# Patient Record
Sex: FEMALE | Race: WHITE | NOT HISPANIC OR LATINO | Employment: PART TIME | URBAN - METROPOLITAN AREA
[De-identification: names, ages, dates, MRNs, and addresses within clinical notes are randomized per-mention and may not be internally consistent; named-entity substitution may affect disease eponyms.]

---

## 2024-06-17 ENCOUNTER — LAB REQUISITION (OUTPATIENT)
Dept: LAB | Facility: HOSPITAL | Age: 29
End: 2024-06-17
Payer: COMMERCIAL

## 2024-06-17 DIAGNOSIS — L02.211 CUTANEOUS ABSCESS OF ABDOMINAL WALL: ICD-10-CM

## 2024-06-17 PROCEDURE — 87186 SC STD MICRODIL/AGAR DIL: CPT

## 2024-06-17 PROCEDURE — 87070 CULTURE OTHR SPECIMN AEROBIC: CPT

## 2024-06-17 PROCEDURE — 87075 CULTR BACTERIA EXCEPT BLOOD: CPT

## 2024-06-17 PROCEDURE — 87205 SMEAR GRAM STAIN: CPT

## 2024-06-18 ENCOUNTER — HOSPITAL ENCOUNTER (EMERGENCY)
Facility: HOSPITAL | Age: 29
Discharge: HOME | End: 2024-06-18
Payer: MEDICAID

## 2024-06-18 VITALS
SYSTOLIC BLOOD PRESSURE: 98 MMHG | TEMPERATURE: 97.5 F | HEART RATE: 89 BPM | WEIGHT: 124 LBS | OXYGEN SATURATION: 100 % | BODY MASS INDEX: 23.41 KG/M2 | DIASTOLIC BLOOD PRESSURE: 58 MMHG | HEIGHT: 61 IN | RESPIRATION RATE: 16 BRPM

## 2024-06-18 DIAGNOSIS — T78.40XA ALLERGIC REACTION TO DRUG, INITIAL ENCOUNTER: Primary | ICD-10-CM

## 2024-06-18 PROCEDURE — 87205 SMEAR GRAM STAIN: CPT | Mod: GEALAB | Performed by: HEALTH CARE PROVIDER

## 2024-06-18 PROCEDURE — 96374 THER/PROPH/DIAG INJ IV PUSH: CPT

## 2024-06-18 PROCEDURE — 96375 TX/PRO/DX INJ NEW DRUG ADDON: CPT

## 2024-06-18 PROCEDURE — 2500000004 HC RX 250 GENERAL PHARMACY W/ HCPCS (ALT 636 FOR OP/ED): Performed by: HEALTH CARE PROVIDER

## 2024-06-18 PROCEDURE — 99284 EMERGENCY DEPT VISIT MOD MDM: CPT | Mod: 25

## 2024-06-18 RX ORDER — FAMOTIDINE 10 MG/ML
40 INJECTION INTRAVENOUS ONCE
Status: COMPLETED | OUTPATIENT
Start: 2024-06-18 | End: 2024-06-18

## 2024-06-18 RX ORDER — PREDNISONE 20 MG/1
40 TABLET ORAL DAILY
Qty: 10 TABLET | Refills: 0 | Status: SHIPPED | OUTPATIENT
Start: 2024-06-18 | End: 2024-06-23

## 2024-06-18 RX ORDER — DOXYCYCLINE 100 MG/1
100 CAPSULE ORAL 2 TIMES DAILY
Qty: 20 CAPSULE | Refills: 0 | Status: SHIPPED | OUTPATIENT
Start: 2024-06-18 | End: 2024-06-28

## 2024-06-18 RX ORDER — FAMOTIDINE 20 MG/1
20 TABLET, FILM COATED ORAL DAILY
Qty: 15 TABLET | Refills: 0 | Status: SHIPPED | OUTPATIENT
Start: 2024-06-18 | End: 2024-07-03

## 2024-06-18 ASSESSMENT — PAIN SCALES - GENERAL
PAINLEVEL_OUTOF10: 0 - NO PAIN

## 2024-06-18 ASSESSMENT — PAIN - FUNCTIONAL ASSESSMENT: PAIN_FUNCTIONAL_ASSESSMENT: 0-10

## 2024-06-18 ASSESSMENT — COLUMBIA-SUICIDE SEVERITY RATING SCALE - C-SSRS
1. IN THE PAST MONTH, HAVE YOU WISHED YOU WERE DEAD OR WISHED YOU COULD GO TO SLEEP AND NOT WAKE UP?: NO
6. HAVE YOU EVER DONE ANYTHING, STARTED TO DO ANYTHING, OR PREPARED TO DO ANYTHING TO END YOUR LIFE?: NO
2. HAVE YOU ACTUALLY HAD ANY THOUGHTS OF KILLING YOURSELF?: NO

## 2024-06-18 NOTE — ED PROVIDER NOTES
HPI   Chief Complaint   Patient presents with    Allergic Reaction     Pt states she took clindamycin and ibuprofen this morning for a spider bite and developed facial swelling and hives on arms and hands.        CC: allergic reaction   HPI:   28-year-old female presents to ED via EMS for allergic reaction patient was prescribed clindamycin recently for insect bite abscess on the right lower abd region. she took a dose today and shortly after started having hives, itching, she denies any chest pain or shortness of breath, she has not had any previous reactions to antibiotics in the past.  Patient was given 50 mg of Benadryl IV by EMS.    Additional Limitations to History:   External Records Reviewed: I reviewed recent and relevant outside records including   History Obtained From:     Past Medical History: Per HPI  Medications: Reviewed in EMR and with patient  Allergies:  Reviewed in EMR  Past Surgical History:   Social History:     ------------------------------------------------------------------------------------------------------  Physical Exam:  --Vital signs reviewed in nursing triage note, EMR flow sheets, and at patient's bedside  GEN:  A&Ox3, no acute distress, appears comfortable.  Conversational and appropriate.  No confusion or gross mental status changes.  EYES: EOMI, non-injected sclera.  ENT: Moist mucous membranes, no apparent injuries or lesions.   CARDIO: Normal rate and regular rhythm. No murmurs, rubs, or gallops.  2+ equal pulses of the distal extremities.   PULM: Clear to auscultation bilaterally. No rales, rhonchi, or wheezes. Good symmetric chest expansion.  GI: Soft, non-tender, non-distended. No rebound tenderness or guarding.  SKIN: hives, to the upper extremities bilaterally, anterior trunk.  Patient also has draining soft tissue abscess in the right lower abdominal region, tender, centralized ulcerated wound, purulent drainage, erythematous surrounding soft tissue appears to have  improved from previous skin marker.  MSK: ROM intact the extremities without contractures.   EXT: No peripheral edema, contusions, or wounds.   NEURO: Cranial nerves II-XII grossly intact. Sensation to light touch intact and equal bilaterally in upper and lower extremities.  Symmetric 5/5 strength in upper and lower extremities.  PSYCH: Appropriate mood and behavior, converses and responds appropriately during exam.  -------------------------------------------------------------------------------------------------------        Differential Diagnoses Considered:   Chronic Medical Conditions Significantly Affecting Care:   Diagnostic testing considered: [PERC, D-Dimer, PECARN, etc.]      - I independently interpreted: [CXR, CT, POCUS, etc. including your interpretation]  - Labs notable for     Escalation of Care: Appropriate for   Social Determinants of Health Significantly Affecting Care: [Homelessness, lacking transportation, uninsured, unable to afford medications]  Prescription Drug Consideration: [Antibiotics, antivirals, pain medications, etc.]  Discussion of Management with Other Providers:  I discussed the patient/results with: [admitting team, consultant, radiologist, social work, EPAT, case management, PT/OT, RT, PCP, etc.]      Luther Woody PA-C                          [place] Coma Scale Score: 15                     Patient History   History reviewed. No pertinent past medical history.  History reviewed. No pertinent surgical history.  No family history on file.  Social History     Tobacco Use    Smoking status: Never    Smokeless tobacco: Never   Substance Use Topics    Alcohol use: Never    Drug use: Never       Physical Exam   ED Triage Vitals [06/18/24 1154]   Temperature Heart Rate Respirations BP   36.4 °C (97.5 °F) 85 16 125/82      Pulse Ox Temp Source Heart Rate Source Patient Position   98 % Temporal Monitor Sitting      BP Location FiO2 (%)     Right arm --       Physical Exam    ED Course &  [facility]   Diagnoses as of 06/18/24 1847   Allergic reaction to drug, initial encounter       Medical Decision Making  28-year-old female who developed hives, urticaria secondary to clindamycin, patient was given Benadryl by EMS, she was also given Pepcid and Solu-Medrol in the ED, she was observed for several hours, her dressing was changed in the right lower quadrant and eyes improved, patient reports feeling moderately better, low suspicion for anaphylaxis,  pending wound culture, placed on doxycycline and given prescription for prednisone and Pepcid close follow-up advised with her PCP regarding her abscess patient was given return ED precautions.        Procedure  Procedures     Luther Woody PA-C  06/18/24 1249       Luther Woody PA-C  06/18/24 8888

## 2024-06-19 ENCOUNTER — HOSPITAL ENCOUNTER (EMERGENCY)
Facility: HOSPITAL | Age: 29
Discharge: HOME | End: 2024-06-19
Payer: MEDICAID

## 2024-06-19 VITALS
HEIGHT: 61 IN | WEIGHT: 125 LBS | RESPIRATION RATE: 18 BRPM | OXYGEN SATURATION: 100 % | SYSTOLIC BLOOD PRESSURE: 130 MMHG | TEMPERATURE: 99.3 F | BODY MASS INDEX: 23.6 KG/M2 | DIASTOLIC BLOOD PRESSURE: 77 MMHG | HEART RATE: 88 BPM

## 2024-06-19 DIAGNOSIS — Z51.89 VISIT FOR WOUND CHECK: Primary | ICD-10-CM

## 2024-06-19 PROCEDURE — 99282 EMERGENCY DEPT VISIT SF MDM: CPT

## 2024-06-19 PROCEDURE — 2500000001 HC RX 250 WO HCPCS SELF ADMINISTERED DRUGS (ALT 637 FOR MEDICARE OP): Performed by: PHYSICIAN ASSISTANT

## 2024-06-19 RX ORDER — MUPIROCIN 20 MG/G
OINTMENT TOPICAL ONCE
Status: COMPLETED | OUTPATIENT
Start: 2024-06-19 | End: 2024-06-19

## 2024-06-19 ASSESSMENT — LIFESTYLE VARIABLES
TOTAL SCORE: 0
EVER HAD A DRINK FIRST THING IN THE MORNING TO STEADY YOUR NERVES TO GET RID OF A HANGOVER: NO
HAVE PEOPLE ANNOYED YOU BY CRITICIZING YOUR DRINKING: NO
EVER FELT BAD OR GUILTY ABOUT YOUR DRINKING: NO
HAVE YOU EVER FELT YOU SHOULD CUT DOWN ON YOUR DRINKING: NO

## 2024-06-19 ASSESSMENT — PAIN - FUNCTIONAL ASSESSMENT: PAIN_FUNCTIONAL_ASSESSMENT: 0-10

## 2024-06-19 ASSESSMENT — PAIN SCALES - GENERAL: PAINLEVEL_OUTOF10: 0 - NO PAIN

## 2024-06-19 NOTE — LETTER
Belinda Betancourt was seen and treated in our emergency department on 6/19/2024.  She may return to work on 06/21/2024.       If you have any questions or concerns, please don't hesitate to call.      Shahriar Sinha PA-C

## 2024-06-19 NOTE — ED PROVIDER NOTES
HPI   Chief Complaint   Patient presents with    Wound Check     Here yesterday for wound concerned that the wound is either MRSA or Staph. On Doxy. States wounds are spreading to her legs        Is a 28-year-old female coming in for evaluation of her wound to her abdomen.  She was here yesterday and was diagnosed as MRSA.  She had a wound culture completed that did come back positive for MRSA.  She was placed on doxycycline.  She is coming in today because she is having drainage from the area.  She did have the area outlined and the erythema has been improving.  Patient denies any fevers or chills.  She does not have a PCP and has not seen a doctor in multiple years.      History provided by:  Patient and relative                      [place] Coma Scale Score: 15                     Patient History   History reviewed. No pertinent past medical history.  History reviewed. No pertinent surgical history.  No family history on file.  Social History     Tobacco Use    Smoking status: Never    Smokeless tobacco: Never   Substance Use Topics    Alcohol use: Never    Drug use: Never       Physical Exam   ED Triage Vitals [06/19/24 1223]   Temperature Heart Rate Respirations BP   37.4 °C (99.3 °F) 88 18 130/77      Pulse Ox Temp Source Heart Rate Source Patient Position   100 % Skin Monitor Lying      BP Location FiO2 (%)     Right arm --       Physical Exam  Constitutional:       General: She is awake. She is not in acute distress.     Appearance: Normal appearance. She is not ill-appearing, toxic-appearing or diaphoretic.   HENT:      Head: Atraumatic.      Nose: No rhinorrhea.   Eyes:      General: No scleral icterus.        Right eye: No discharge.         Left eye: No discharge.   Cardiovascular:      Rate and Rhythm: Normal rate.   Pulmonary:      Effort: Pulmonary effort is normal. No respiratory distress.   Musculoskeletal:      Cervical back: Normal range of motion and neck supple.   Skin:     Findings: Wound  present.      Comments: Patient has an approximate quarter sized area of ulceration with drainage to the right anterior lower abdomen.  There is surrounding erythema as well as an outlined area that the erythema does contain an side.  Patient does have active drainage of purulent material.  There is warmth to the area.   Neurological:      Mental Status: She is alert.      GCS: GCS eye subscore is 4. GCS verbal subscore is 5. GCS motor subscore is 6.      Gait: Gait is intact.         ED Course & MDM   Diagnoses as of 06/19/24 1330   Visit for wound check       Medical Decision Making  Summary:  Medical Decision Making:   Patient presented as described in HPI. Patient case including ROS, PE, and treatment and plan discussed with ED attending if attached as cosigner. Results from labs and or imaging included below if completed. Belinda Betancourt  is a 28 y.o. coming in for Patient presents with:  Wound Check: Here yesterday for wound concerned that the wound is either MRSA or Staph. On Doxy. States wounds are spreading to her legs   .  Patient does not appear toxic.  She is on Doxy.  She does have an open area of drainage and advised that we can add Bactroban.  It does appear improving.  Doxycycline will be continued and she will be given the Bactroban ointment to apply 3 times a day for 5 days.  She also given a referral for the wound care clinic as well as the need for establishment of a PCP.      Disposition is completed with shared decision making with the patient or guardian present with the patient. They were advised to follow up with PCP or recommended provider in 2-3 days for another evaluation and exam. I advised the patient to return or go to closest emergency room immediately if symptoms change, get worse, or new symptoms develop prior to follow up. I explained the plan and treatment course. Patient/guardian is in agreement with plan, treatment course, and follow up and state that they will comply.    Labs  Reviewed - No data to display   No orders to display                         Tests/Medications/Escalations of Care considered but not given: As in MDM    Patient care discussed with: N/A  Social Determinants affecting care: N/A    Final diagnosis and disposition as documented     Diagnoses as of 06/19/24 1336  Visit for wound check       Shared decision making was completed and determined that patient will be discharged. I discussed the differential; results and discharge plan with the patient and/or family/friend/caregiver if present.  I emphasized the importance of follow-up with the physician I referred them to in the timeframe recommended.  I explained reasons for the patient to return to the Emergency Department. They agreed that if they feel their condition is worsening or if they have any other concern they should call 911 immediately for further assistance. I gave the patient an opportunity to ask all questions they had and answered all of them accordingly. They understand return precautions and discharge instructions. The patient and/or family/friend/caregiver expressed understanding verbally and that they would comply.     Disposition: Discharge      This note has been transcribed using voice recognition and may contain grammatical errors, misplaced words, incorrect words, incorrect phrases or other errors.         Procedure  Procedures     Shahriar Sinha PA-C  06/19/24 2941

## 2024-06-19 NOTE — ED TRIAGE NOTES
Patient here for wound check Here yesterday for wound concerned that the wound is either MRSA or Staph. On Doxy. States wounds are spreading to her legs

## 2024-06-20 LAB
BACTERIA SPEC CULT: ABNORMAL
GRAM STN SPEC: ABNORMAL
GRAM STN SPEC: ABNORMAL

## 2024-06-21 LAB
BACTERIA SPEC CULT: ABNORMAL
GRAM STN SPEC: ABNORMAL
GRAM STN SPEC: ABNORMAL

## 2024-06-22 ENCOUNTER — TELEPHONE (OUTPATIENT)
Dept: PHARMACY | Facility: HOSPITAL | Age: 29
End: 2024-06-22
Payer: MEDICAID

## 2024-06-22 NOTE — PROGRESS NOTES
EDPD Note: Rapid Result Review    Reviewed Mr./Mrs./Ms. Belinda Betancourt 's chart regarding a positive wound culture/result that was taken during their recent emergency room visit. Patient was in ED on 6/18 for wound and prescribed Doxycyline. Patient went back to ED on 6/19 due to drainage, where she  was told about these results prior to leaving the emergency department. Therefore, patient was not contacted as education was provider prior to discharge. Patient aware of MRSA in wound and prescribed Doxycycline, which is appropriate. ED provider told patient to continue treatment and follow up with wound care.     Susceptibility data from last 90 days.  Collected Specimen Info Organism Clindamycin Erythromycin Oxacillin Tetracycline Trimethoprim/Sulfamethoxazole Vancomycin   06/18/24 Tissue/Biopsy from Skin/Superficial Abscess Staphylococcus aureus         06/17/24 Tissue/Biopsy from Skin/Superficial Abscess Methicillin Resistant Staphylococcus aureus (MRSA)  S  R  R  S  S  S       No further follow up needed from EDPD Team.     Luisa Lujan, PharmD

## 2024-06-25 ENCOUNTER — OFFICE VISIT (OUTPATIENT)
Dept: WOUND CARE | Facility: CLINIC | Age: 29
End: 2024-06-25
Payer: MEDICAID

## 2024-06-25 PROCEDURE — 11042 DBRDMT SUBQ TIS 1ST 20SQCM/<: CPT

## 2024-06-25 PROCEDURE — 99213 OFFICE O/P EST LOW 20 MIN: CPT | Mod: 25

## 2024-07-02 ENCOUNTER — OFFICE VISIT (OUTPATIENT)
Dept: WOUND CARE | Facility: CLINIC | Age: 29
End: 2024-07-02
Payer: MEDICAID

## 2024-07-02 PROCEDURE — 11042 DBRDMT SUBQ TIS 1ST 20SQCM/<: CPT

## 2024-07-10 ENCOUNTER — LAB (OUTPATIENT)
Dept: LAB | Facility: LAB | Age: 29
End: 2024-07-10
Payer: MEDICAID

## 2024-07-10 ENCOUNTER — APPOINTMENT (OUTPATIENT)
Dept: PRIMARY CARE | Facility: CLINIC | Age: 29
End: 2024-07-10
Payer: MEDICAID

## 2024-07-10 VITALS
DIASTOLIC BLOOD PRESSURE: 74 MMHG | HEIGHT: 61 IN | BODY MASS INDEX: 23.6 KG/M2 | WEIGHT: 125 LBS | SYSTOLIC BLOOD PRESSURE: 120 MMHG | HEART RATE: 74 BPM

## 2024-07-10 DIAGNOSIS — Z23 ENCOUNTER FOR IMMUNIZATION: ICD-10-CM

## 2024-07-10 DIAGNOSIS — R53.83 OTHER FATIGUE: ICD-10-CM

## 2024-07-10 DIAGNOSIS — Z00.00 ENCOUNTER FOR PREVENTIVE HEALTH EXAMINATION: ICD-10-CM

## 2024-07-10 DIAGNOSIS — Z11.59 ENCOUNTER FOR HEPATITIS C SCREENING TEST FOR LOW RISK PATIENT: ICD-10-CM

## 2024-07-10 DIAGNOSIS — R68.89 COLD INTOLERANCE: ICD-10-CM

## 2024-07-10 DIAGNOSIS — R21 RASH: ICD-10-CM

## 2024-07-10 DIAGNOSIS — A49.02 MRSA INFECTION: ICD-10-CM

## 2024-07-10 DIAGNOSIS — Z00.00 ENCOUNTER FOR PREVENTIVE HEALTH EXAMINATION: Primary | ICD-10-CM

## 2024-07-10 DIAGNOSIS — L65.9 HAIR LOSS: ICD-10-CM

## 2024-07-10 DIAGNOSIS — R01.1 HEART MURMUR, SYSTOLIC: ICD-10-CM

## 2024-07-10 LAB
ALBUMIN SERPL BCP-MCNC: 4.1 G/DL (ref 3.4–5)
ALP SERPL-CCNC: 38 U/L (ref 33–110)
ALT SERPL W P-5'-P-CCNC: 11 U/L (ref 7–45)
ANION GAP SERPL CALC-SCNC: 9 MMOL/L (ref 10–20)
AST SERPL W P-5'-P-CCNC: 18 U/L (ref 9–39)
BILIRUB SERPL-MCNC: 0.2 MG/DL (ref 0–1.2)
BUN SERPL-MCNC: 14 MG/DL (ref 6–23)
CALCIUM SERPL-MCNC: 8.7 MG/DL (ref 8.6–10.3)
CHLORIDE SERPL-SCNC: 109 MMOL/L (ref 98–107)
CHOLEST SERPL-MCNC: 116 MG/DL (ref 0–199)
CHOLESTEROL/HDL RATIO: 2.1
CO2 SERPL-SCNC: 24 MMOL/L (ref 21–32)
CREAT SERPL-MCNC: 0.49 MG/DL (ref 0.5–1.05)
EGFRCR SERPLBLD CKD-EPI 2021: >90 ML/MIN/1.73M*2
ERYTHROCYTE [DISTWIDTH] IN BLOOD BY AUTOMATED COUNT: 22.2 % (ref 11.5–14.5)
FERRITIN SERPL-MCNC: 8 NG/ML (ref 8–150)
FOLATE SERPL-MCNC: 10.2 NG/ML
GLUCOSE SERPL-MCNC: 77 MG/DL (ref 74–99)
HCT VFR BLD AUTO: 26.6 % (ref 36–46)
HDLC SERPL-MCNC: 55 MG/DL
HGB BLD-MCNC: 6.7 G/DL (ref 12–16)
IRON SATN MFR SERPL: 3 %
IRON SERPL-MCNC: 13 UG/DL (ref 35–150)
LDLC SERPL CALC-MCNC: 49 MG/DL
MCH RBC QN AUTO: 15.9 PG (ref 26–34)
MCHC RBC AUTO-ENTMCNC: 25.2 G/DL (ref 32–36)
MCV RBC AUTO: 63 FL (ref 80–100)
NON HDL CHOLESTEROL: 61 MG/DL (ref 0–149)
NRBC BLD-RTO: 0.3 /100 WBCS (ref 0–0)
PLATELET # BLD AUTO: 370 X10*3/UL (ref 150–450)
POTASSIUM SERPL-SCNC: 4 MMOL/L (ref 3.5–5.3)
PROT SERPL-MCNC: 6.9 G/DL (ref 6.4–8.2)
RBC # BLD AUTO: 4.21 X10*6/UL (ref 4–5.2)
SODIUM SERPL-SCNC: 138 MMOL/L (ref 136–145)
TIBC SERPL-MCNC: 442 UG/DL
TRIGL SERPL-MCNC: 60 MG/DL (ref 0–149)
TSH SERPL-ACNC: 0.7 MIU/L (ref 0.44–3.98)
UIBC SERPL-MCNC: 429 UG/DL (ref 110–370)
VIT B12 SERPL-MCNC: 267 PG/ML (ref 211–911)
VLDL: 12 MG/DL (ref 0–40)
WBC # BLD AUTO: 6.2 X10*3/UL (ref 4.4–11.3)

## 2024-07-10 PROCEDURE — 84425 ASSAY OF VITAMIN B-1: CPT

## 2024-07-10 PROCEDURE — 83516 IMMUNOASSAY NONANTIBODY: CPT

## 2024-07-10 PROCEDURE — 99204 OFFICE O/P NEW MOD 45 MIN: CPT | Performed by: STUDENT IN AN ORGANIZED HEALTH CARE EDUCATION/TRAINING PROGRAM

## 2024-07-10 PROCEDURE — 86803 HEPATITIS C AB TEST: CPT

## 2024-07-10 PROCEDURE — 1036F TOBACCO NON-USER: CPT | Performed by: STUDENT IN AN ORGANIZED HEALTH CARE EDUCATION/TRAINING PROGRAM

## 2024-07-10 PROCEDURE — 90471 IMMUNIZATION ADMIN: CPT | Performed by: STUDENT IN AN ORGANIZED HEALTH CARE EDUCATION/TRAINING PROGRAM

## 2024-07-10 PROCEDURE — 83735 ASSAY OF MAGNESIUM: CPT

## 2024-07-10 PROCEDURE — 80061 LIPID PANEL: CPT

## 2024-07-10 PROCEDURE — 36415 COLL VENOUS BLD VENIPUNCTURE: CPT

## 2024-07-10 PROCEDURE — 83550 IRON BINDING TEST: CPT

## 2024-07-10 PROCEDURE — 80053 COMPREHEN METABOLIC PANEL: CPT

## 2024-07-10 PROCEDURE — 86038 ANTINUCLEAR ANTIBODIES: CPT

## 2024-07-10 PROCEDURE — 83036 HEMOGLOBIN GLYCOSYLATED A1C: CPT

## 2024-07-10 PROCEDURE — 82607 VITAMIN B-12: CPT

## 2024-07-10 PROCEDURE — 85027 COMPLETE CBC AUTOMATED: CPT

## 2024-07-10 PROCEDURE — 83540 ASSAY OF IRON: CPT

## 2024-07-10 PROCEDURE — 90715 TDAP VACCINE 7 YRS/> IM: CPT | Performed by: STUDENT IN AN ORGANIZED HEALTH CARE EDUCATION/TRAINING PROGRAM

## 2024-07-10 PROCEDURE — 82746 ASSAY OF FOLIC ACID SERUM: CPT

## 2024-07-10 PROCEDURE — 82728 ASSAY OF FERRITIN: CPT

## 2024-07-10 PROCEDURE — 84443 ASSAY THYROID STIM HORMONE: CPT

## 2024-07-10 PROCEDURE — 99385 PREV VISIT NEW AGE 18-39: CPT | Performed by: STUDENT IN AN ORGANIZED HEALTH CARE EDUCATION/TRAINING PROGRAM

## 2024-07-10 NOTE — PROGRESS NOTES
Subjective   Patient ID: Belinda Betancourt is a 28 y.o. female who presents for Annual Exam and Rash.    HPI  Diet is unhealthy.  Does not exercise regularly.  PAP is not up to date.  Vaccines are not up to date; they are due for: Tdap  Dental exam is not up to date.  Vision exam is up to date.  Patient is fasting for labs today.  Social: Tobacco use- denies       Alcohol use- denies    Recent emergency room visits for an infected bug bite on her abdomen.  She initially started with clindamycin, but had a possible allergic reaction to this.  A wound culture was done at her initial ER visit that ended up growing MRSA and she was prescribed doxycycline.  At her second ER visit, it seemed that the erythema surrounding the area was improved after starting the doxycycline.  There was a pharmacist note after her second ER visit that showed that the doxycycline should be appropriate to treat the MRSA.  The area of concern is now pretty much resolved.    She lives with her sister, prior to this she lived with her parents.  She does work at [employer] here in [place].    She complains of this new spreading rash that keeps popping up especially on her hands.  She is always cold, always wearing a lot of layers.  She has had a lot of hair loss that she explains is hair thinning plus she has a patch of baldness on her scalp.  She is always tired, always feels like she needs a nap.  She has had a little bit of weight loss, but has been weight stable for some time now.  Her diet is poor, consisting mostly of frozen TV dinners.  She has not had any shortness of breath or chest pain.  She does report a lot of bloating and gassiness with eating.  No blood in the stool or chronic diarrhea.    Review of Systems  All pertinent positive symptoms are included in the history of present illness.    All other systems have been reviewed and are negative and noncontributory to this patient's current ailments.     Social History      Tobacco Use   • Smoking status: Never   • Smokeless tobacco: Never   Substance Use Topics   • Alcohol use: Never      History reviewed. No pertinent surgical history.   Allergies   Allergen Reactions   • Clindamycin Hives        Current Outpatient Medications   Medication Sig Dispense Refill   • famotidine (Pepcid) 20 mg tablet Take 1 tablet (20 mg) by mouth once daily for 15 days. 15 tablet 0     No current facility-administered medications for this visit.        There is no problem list on file for this patient.     There is no immunization history for the selected administration types on file for this patient.    Objective   VITALS  There were no vitals taken for this visit.     Physical Exam  CONSTITUTIONAL - well nourished, well developed, looks like stated age, in no acute distress, not ill-appearing, and not tired appearing  SKIN - pallor noted, papular rash on the bilateral hands and abdomen; patchy hair loss on the scalp  HEAD - no trauma, normocephalic  EYES - pupils are equal and reactive to light, extraocular muscles are intact, and normal external exam  ENT - TM's intact, no injection, no signs of infection, uvula midline, normal tongue movement and throat normal, no exudate  NECK - supple without rigidity, no neck mass was observed, no thyromegaly or thyroid nodules  CHEST - clear to auscultation, no wheezing, no crackles and no rales, good effort  CARDIAC - regular rate and regular rhythm, no skipped beats, systolic murmur present heart best at the RUSB  ABDOMEN - no organomegaly, soft, nontender, nondistended, normal bowel sounds, no guarding/rebound/rigidity  EXTREMITIES - no edema, no deformities  NEUROLOGICAL - normal gait, normal balance, normal motor, no ataxia, DTRs equal and symmetrical; alert, oriented and no focal signs  PSYCHIATRIC - alert, pleasant and cordial  IMMUNOLOGIC - no cervical lymphadenopathy     Recent Results (from the past 2016 hour(s))   Tissue/Wound Culture/Smear     Collection Time: 06/17/24  4:56 PM    Specimen: Skin/Superficial Abscess; Tissue/Biopsy   Result Value Ref Range    Tissue/Wound Culture/Smear (A)      (4+) Abundant Methicillin Resistant Staphylococcus aureus (MRSA)    Gram Stain (1+) Rare Polymorphonuclear leukocytes (A)     Gram Stain (2+) Few Gram positive cocci, clusters (A)        Susceptibility    Methicillin Resistant Staphylococcus aureus (MRSA) - MICROSCAN     Clindamycin  Susceptible mcg/mL     Erythromycin  Resistant mcg/mL     Oxacillin  Resistant mcg/mL     Tetracycline  Susceptible mcg/mL     Trimethoprim/Sulfamethoxazole  Susceptible mcg/mL     Vancomycin  Susceptible mcg/mL   Tissue/Wound Culture/Smear    Collection Time: 06/18/24  1:22 PM    Specimen: Skin/Superficial Abscess; Tissue/Biopsy   Result Value Ref Range    Tissue/Wound Culture/Smear (2+) Few Staphylococcus aureus (A)     Gram Stain No polymorphonuclear leukocytes seen (A)     Gram Stain (1+) Rare Gram positive cocci, clusters (A)         Assessment/Plan   Diagnoses and all orders for this visit:  Encounter for preventive health examination  A complete history and physical was performed today.  Vaccines are not up to date. Tdap given  PAP is not up to date.  Fasting labs ordered today include:  -     CBC; Future  -     Comprehensive Metabolic Panel; Future  -     Hemoglobin A1C; Future  -     Lipid Panel; Future  -     TSH with reflex to Free T4 if abnormal; Future  Encounter for hepatitis C screening test for low risk patient  -     Hepatitis C Antibody; Future  Encounter for immunization  Immunization side effects explained and injection was given without complication.  -     Tdap vaccine, age 7 years and older  MRSA infection  Resolved. Consider MRSA carrier testing and treatment.  Hair loss  -     Celiac Panel; Future  -     Iron and TIBC; Future  -     Ferritin; Future  -     Vitamin B12; Future  -     Folate; Future  -     Vitamin B1, Whole Blood; Future  -     COREY with Reflex to  JORY; Future  Rash  -     Celiac Panel; Future  -     Iron and TIBC; Future  -     Ferritin; Future  -     Vitamin B12; Future  -     Folate; Future  -     Vitamin B1, Whole Blood; Future  -     COREY with Reflex to JORY; Future  Cold intolerance  -     Celiac Panel; Future  -     Iron and TIBC; Future  -     Ferritin; Future  -     Vitamin B12; Future  -     Folate; Future  -     Vitamin B1, Whole Blood; Future  -     COREY with Reflex to JORY; Future  Other fatigue  -     Celiac Panel; Future  -     Iron and TIBC; Future  -     Ferritin; Future  -     Vitamin B12; Future  -     Folate; Future  -     Vitamin B1, Whole Blood; Future  -     COREY with Reflex to JORY; Future  Heart murmur, systolic  She is not aware of ever having a murmur so will get echo for further evaluation.  -     Transthoracic Echo (TTE) Complete; Future    Follow up pending above results and in one year for CPE.

## 2024-07-11 ENCOUNTER — HOSPITAL ENCOUNTER (OUTPATIENT)
Facility: HOSPITAL | Age: 29
Setting detail: OBSERVATION
Discharge: HOME | End: 2024-07-12
Attending: EMERGENCY MEDICINE | Admitting: STUDENT IN AN ORGANIZED HEALTH CARE EDUCATION/TRAINING PROGRAM
Payer: MEDICAID

## 2024-07-11 ENCOUNTER — APPOINTMENT (OUTPATIENT)
Dept: RADIOLOGY | Facility: HOSPITAL | Age: 29
End: 2024-07-11
Payer: MEDICAID

## 2024-07-11 DIAGNOSIS — D64.9 ANEMIA, UNSPECIFIED TYPE: Primary | ICD-10-CM

## 2024-07-11 LAB
ABO GROUP (TYPE) IN BLOOD: NORMAL
ABO GROUP (TYPE) IN BLOOD: NORMAL
ALBUMIN SERPL BCP-MCNC: 4.1 G/DL (ref 3.4–5)
ALP SERPL-CCNC: 42 U/L (ref 33–110)
ALT SERPL W P-5'-P-CCNC: 11 U/L (ref 7–45)
ANION GAP SERPL CALC-SCNC: 12 MMOL/L (ref 10–20)
ANTIBODY SCREEN: NORMAL
AST SERPL W P-5'-P-CCNC: 14 U/L (ref 9–39)
BASOPHILS # BLD AUTO: 0.03 X10*3/UL (ref 0–0.1)
BASOPHILS NFR BLD AUTO: 0.4 %
BILIRUB SERPL-MCNC: 0.2 MG/DL (ref 0–1.2)
BILIRUB SERPL-MCNC: 0.8 MG/DL (ref 0–1.2)
BLOOD EXPIRATION DATE: NORMAL
BUN SERPL-MCNC: 13 MG/DL (ref 6–23)
CALCIUM SERPL-MCNC: 8.9 MG/DL (ref 8.6–10.3)
CHLORIDE SERPL-SCNC: 108 MMOL/L (ref 98–107)
CO2 SERPL-SCNC: 24 MMOL/L (ref 21–32)
CREAT SERPL-MCNC: 0.44 MG/DL (ref 0.5–1.05)
DACRYOCYTES BLD QL SMEAR: NORMAL
DISPENSE STATUS: NORMAL
EGFRCR SERPLBLD CKD-EPI 2021: >90 ML/MIN/1.73M*2
EOSINOPHIL # BLD AUTO: 0.22 X10*3/UL (ref 0–0.7)
EOSINOPHIL NFR BLD AUTO: 3.1 %
ERYTHROCYTE [DISTWIDTH] IN BLOOD BY AUTOMATED COUNT: 21.5 % (ref 11.5–14.5)
ERYTHROCYTE [DISTWIDTH] IN BLOOD BY AUTOMATED COUNT: 22.9 % (ref 11.5–14.5)
EST. AVERAGE GLUCOSE BLD GHB EST-MCNC: 117 MG/DL
GLIADIN PEPTIDE IGA SER IA-ACNC: <1 U/ML
GLUCOSE SERPL-MCNC: 74 MG/DL (ref 74–99)
HBA1C MFR BLD: 5.7 %
HCT VFR BLD AUTO: 24 % (ref 36–46)
HCT VFR BLD AUTO: 25.8 % (ref 36–46)
HCV AB SER QL: NONREACTIVE
HGB BLD-MCNC: 6.3 G/DL (ref 12–16)
HGB BLD-MCNC: 7.3 G/DL (ref 12–16)
HGB RETIC QN: 16 PG (ref 28–38)
HYPOCHROMIA BLD QL SMEAR: NORMAL
IMM GRANULOCYTES # BLD AUTO: 0.02 X10*3/UL (ref 0–0.7)
IMM GRANULOCYTES NFR BLD AUTO: 0.3 % (ref 0–0.9)
IMMATURE RETIC FRACTION: 13 %
INR PPP: 1 (ref 0.9–1.1)
LACTATE SERPL-SCNC: 1.1 MMOL/L (ref 0.4–2)
LDH SERPL L TO P-CCNC: 106 U/L (ref 84–246)
LYMPHOCYTES # BLD AUTO: 1.41 X10*3/UL (ref 1.2–4.8)
LYMPHOCYTES NFR BLD AUTO: 20.1 %
MAGNESIUM SERPL-MCNC: 2.24 MG/DL (ref 1.6–2.4)
MCH RBC QN AUTO: 16.2 PG (ref 26–34)
MCH RBC QN AUTO: 17.5 PG (ref 26–34)
MCHC RBC AUTO-ENTMCNC: 26.3 G/DL (ref 32–36)
MCHC RBC AUTO-ENTMCNC: 28.3 G/DL (ref 32–36)
MCV RBC AUTO: 62 FL (ref 80–100)
MCV RBC AUTO: 62 FL (ref 80–100)
MONOCYTES # BLD AUTO: 0.71 X10*3/UL (ref 0.1–1)
MONOCYTES NFR BLD AUTO: 10.1 %
NEUTROPHILS # BLD AUTO: 4.61 X10*3/UL (ref 1.2–7.7)
NEUTROPHILS NFR BLD AUTO: 66 %
NRBC BLD-RTO: 0 /100 WBCS (ref 0–0)
NRBC BLD-RTO: 0 /100 WBCS (ref 0–0)
OVALOCYTES BLD QL SMEAR: NORMAL
PLATELET # BLD AUTO: 283 X10*3/UL (ref 150–450)
PLATELET # BLD AUTO: 350 X10*3/UL (ref 150–450)
POLYCHROMASIA BLD QL SMEAR: NORMAL
POTASSIUM SERPL-SCNC: 3.5 MMOL/L (ref 3.5–5.3)
PRODUCT BLOOD TYPE: 600
PRODUCT CODE: NORMAL
PROT SERPL-MCNC: 7 G/DL (ref 6.4–8.2)
PROTHROMBIN TIME: 11.8 SECONDS (ref 9.8–12.8)
RBC # BLD AUTO: 3.9 X10*6/UL (ref 4–5.2)
RBC # BLD AUTO: 4.18 X10*6/UL (ref 4–5.2)
RBC MORPH BLD: NORMAL
RETICS #: 0.02 X10*6/UL (ref 0.02–0.08)
RETICS/RBC NFR AUTO: 0.5 % (ref 0.5–2)
RH FACTOR (ANTIGEN D): NORMAL
RH FACTOR (ANTIGEN D): NORMAL
SODIUM SERPL-SCNC: 140 MMOL/L (ref 136–145)
TARGETS BLD QL SMEAR: NORMAL
TTG IGA SER IA-ACNC: <1 U/ML
UNIT ABO: NORMAL
UNIT NUMBER: NORMAL
UNIT RH: NORMAL
UNIT VOLUME: 288
WBC # BLD AUTO: 7 X10*3/UL (ref 4.4–11.3)
WBC # BLD AUTO: 9.5 X10*3/UL (ref 4.4–11.3)
XM INTEP: NORMAL

## 2024-07-11 PROCEDURE — 85610 PROTHROMBIN TIME: CPT | Performed by: HEALTH CARE PROVIDER

## 2024-07-11 PROCEDURE — 71045 X-RAY EXAM CHEST 1 VIEW: CPT | Performed by: STUDENT IN AN ORGANIZED HEALTH CARE EDUCATION/TRAINING PROGRAM

## 2024-07-11 PROCEDURE — 2500000004 HC RX 250 GENERAL PHARMACY W/ HCPCS (ALT 636 FOR OP/ED)

## 2024-07-11 PROCEDURE — 85027 COMPLETE CBC AUTOMATED: CPT | Mod: 59

## 2024-07-11 PROCEDURE — 82247 BILIRUBIN TOTAL: CPT | Mod: 59

## 2024-07-11 PROCEDURE — 71045 X-RAY EXAM CHEST 1 VIEW: CPT

## 2024-07-11 PROCEDURE — 96365 THER/PROPH/DIAG IV INF INIT: CPT

## 2024-07-11 PROCEDURE — G0378 HOSPITAL OBSERVATION PER HR: HCPCS

## 2024-07-11 PROCEDURE — 85025 COMPLETE CBC W/AUTO DIFF WBC: CPT | Performed by: EMERGENCY MEDICINE

## 2024-07-11 PROCEDURE — P9016 RBC LEUKOCYTES REDUCED: HCPCS

## 2024-07-11 PROCEDURE — 86920 COMPATIBILITY TEST SPIN: CPT

## 2024-07-11 PROCEDURE — 83615 LACTATE (LD) (LDH) ENZYME: CPT

## 2024-07-11 PROCEDURE — 36430 TRANSFUSION BLD/BLD COMPNT: CPT

## 2024-07-11 PROCEDURE — 94760 N-INVAS EAR/PLS OXIMETRY 1: CPT

## 2024-07-11 PROCEDURE — 2500000001 HC RX 250 WO HCPCS SELF ADMINISTERED DRUGS (ALT 637 FOR MEDICARE OP)

## 2024-07-11 PROCEDURE — 36415 COLL VENOUS BLD VENIPUNCTURE: CPT | Performed by: EMERGENCY MEDICINE

## 2024-07-11 PROCEDURE — 84075 ASSAY ALKALINE PHOSPHATASE: CPT | Performed by: EMERGENCY MEDICINE

## 2024-07-11 PROCEDURE — 86900 BLOOD TYPING SEROLOGIC ABO: CPT | Performed by: HEALTH CARE PROVIDER

## 2024-07-11 PROCEDURE — 85045 AUTOMATED RETICULOCYTE COUNT: CPT

## 2024-07-11 PROCEDURE — 36415 COLL VENOUS BLD VENIPUNCTURE: CPT | Performed by: HEALTH CARE PROVIDER

## 2024-07-11 PROCEDURE — 99285 EMERGENCY DEPT VISIT HI MDM: CPT | Mod: 25

## 2024-07-11 PROCEDURE — 83605 ASSAY OF LACTIC ACID: CPT

## 2024-07-11 PROCEDURE — 83010 ASSAY OF HAPTOGLOBIN QUANT: CPT

## 2024-07-11 RX ORDER — POLYETHYLENE GLYCOL 3350 17 G/17G
17 POWDER, FOR SOLUTION ORAL DAILY
Status: DISCONTINUED | OUTPATIENT
Start: 2024-07-11 | End: 2024-07-12 | Stop reason: HOSPADM

## 2024-07-11 RX ORDER — IBUPROFEN 800 MG/1
800 TABLET ORAL EVERY 8 HOURS PRN
Status: ON HOLD | COMMUNITY
End: 2024-07-12 | Stop reason: ENTERED-IN-ERROR

## 2024-07-11 SDOH — SOCIAL STABILITY: SOCIAL INSECURITY: DO YOU FEEL UNSAFE GOING BACK TO THE PLACE WHERE YOU ARE LIVING?: NO

## 2024-07-11 SDOH — SOCIAL STABILITY: SOCIAL INSECURITY: HAS ANYONE EVER THREATENED TO HURT YOUR FAMILY OR YOUR PETS?: NO

## 2024-07-11 SDOH — SOCIAL STABILITY: SOCIAL INSECURITY: WERE YOU ABLE TO COMPLETE ALL THE BEHAVIORAL HEALTH SCREENINGS?: YES

## 2024-07-11 SDOH — SOCIAL STABILITY: SOCIAL INSECURITY: ABUSE: ADULT

## 2024-07-11 SDOH — SOCIAL STABILITY: SOCIAL INSECURITY: ARE YOU OR HAVE YOU BEEN THREATENED OR ABUSED PHYSICALLY, EMOTIONALLY, OR SEXUALLY BY ANYONE?: NO

## 2024-07-11 SDOH — SOCIAL STABILITY: SOCIAL INSECURITY: DOES ANYONE TRY TO KEEP YOU FROM HAVING/CONTACTING OTHER FRIENDS OR DOING THINGS OUTSIDE YOUR HOME?: NO

## 2024-07-11 SDOH — SOCIAL STABILITY: SOCIAL INSECURITY: DO YOU FEEL ANYONE HAS EXPLOITED OR TAKEN ADVANTAGE OF YOU FINANCIALLY OR OF YOUR PERSONAL PROPERTY?: NO

## 2024-07-11 SDOH — SOCIAL STABILITY: SOCIAL INSECURITY: HAVE YOU HAD ANY THOUGHTS OF HARMING ANYONE ELSE?: NO

## 2024-07-11 SDOH — SOCIAL STABILITY: SOCIAL INSECURITY: HAVE YOU HAD THOUGHTS OF HARMING ANYONE ELSE?: NO

## 2024-07-11 SDOH — SOCIAL STABILITY: SOCIAL INSECURITY: ARE THERE ANY APPARENT SIGNS OF INJURIES/BEHAVIORS THAT COULD BE RELATED TO ABUSE/NEGLECT?: NO

## 2024-07-11 ASSESSMENT — ENCOUNTER SYMPTOMS
RHINORRHEA: 0
SEIZURES: 0
NUMBNESS: 0
COUGH: 0
VOMITING: 0
APNEA: 0
CHILLS: 0
FEVER: 0
DIFFICULTY URINATING: 0
CHEST TIGHTNESS: 0
NAUSEA: 0
EYE DISCHARGE: 0
PALPITATIONS: 0
LIGHT-HEADEDNESS: 1
SHORTNESS OF BREATH: 0
BLOOD IN STOOL: 0
TREMORS: 0
HALLUCINATIONS: 0
DIARRHEA: 0
CONSTIPATION: 0
BACK PAIN: 0
CHOKING: 0
SORE THROAT: 0
DIAPHORESIS: 0
HEMATURIA: 0
DYSURIA: 0
ABDOMINAL PAIN: 0
CONFUSION: 0
FATIGUE: 1
EYE ITCHING: 0
FLANK PAIN: 0
ABDOMINAL DISTENTION: 0
APPETITE CHANGE: 0

## 2024-07-11 ASSESSMENT — COGNITIVE AND FUNCTIONAL STATUS - GENERAL
DAILY ACTIVITIY SCORE: 24
MOBILITY SCORE: 24
PATIENT BASELINE BEDBOUND: NO

## 2024-07-11 ASSESSMENT — ACTIVITIES OF DAILY LIVING (ADL)
HEARING - LEFT EAR: FUNCTIONAL
JUDGMENT_ADEQUATE_SAFELY_COMPLETE_DAILY_ACTIVITIES: YES
WALKS IN HOME: INDEPENDENT
HEARING - RIGHT EAR: FUNCTIONAL
PATIENT'S MEMORY ADEQUATE TO SAFELY COMPLETE DAILY ACTIVITIES?: YES
BATHING: INDEPENDENT
TOILETING: INDEPENDENT
DRESSING YOURSELF: INDEPENDENT
FEEDING YOURSELF: INDEPENDENT
LACK_OF_TRANSPORTATION: NO
GROOMING: INDEPENDENT
ADEQUATE_TO_COMPLETE_ADL: YES

## 2024-07-11 ASSESSMENT — LIFESTYLE VARIABLES
EVER HAD A DRINK FIRST THING IN THE MORNING TO STEADY YOUR NERVES TO GET RID OF A HANGOVER: NO
TOTAL SCORE: 0
HAVE PEOPLE ANNOYED YOU BY CRITICIZING YOUR DRINKING: NO
HOW OFTEN DO YOU HAVE 6 OR MORE DRINKS ON ONE OCCASION: NEVER
HOW MANY STANDARD DRINKS CONTAINING ALCOHOL DO YOU HAVE ON A TYPICAL DAY: PATIENT DOES NOT DRINK
HAVE YOU EVER FELT YOU SHOULD CUT DOWN ON YOUR DRINKING: NO
HOW OFTEN DO YOU HAVE A DRINK CONTAINING ALCOHOL: NEVER
EVER FELT BAD OR GUILTY ABOUT YOUR DRINKING: NO
AUDIT-C TOTAL SCORE: 0
SKIP TO QUESTIONS 9-10: 1
AUDIT-C TOTAL SCORE: 0

## 2024-07-11 ASSESSMENT — PATIENT HEALTH QUESTIONNAIRE - PHQ9
SUM OF ALL RESPONSES TO PHQ9 QUESTIONS 1 & 2: 0
2. FEELING DOWN, DEPRESSED OR HOPELESS: NOT AT ALL
1. LITTLE INTEREST OR PLEASURE IN DOING THINGS: NOT AT ALL

## 2024-07-11 ASSESSMENT — PAIN SCALES - GENERAL
PAINLEVEL_OUTOF10: 0 - NO PAIN

## 2024-07-11 ASSESSMENT — PAIN - FUNCTIONAL ASSESSMENT
PAIN_FUNCTIONAL_ASSESSMENT: 0-10

## 2024-07-11 ASSESSMENT — COLUMBIA-SUICIDE SEVERITY RATING SCALE - C-SSRS
6. HAVE YOU EVER DONE ANYTHING, STARTED TO DO ANYTHING, OR PREPARED TO DO ANYTHING TO END YOUR LIFE?: NO
2. HAVE YOU ACTUALLY HAD ANY THOUGHTS OF KILLING YOURSELF?: NO
1. IN THE PAST MONTH, HAVE YOU WISHED YOU WERE DEAD OR WISHED YOU COULD GO TO SLEEP AND NOT WAKE UP?: NO

## 2024-07-11 NOTE — ED PROVIDER NOTES
HPI   Chief Complaint   Patient presents with    abnormal results     Got blood work done at PCP and blood work came back abnormal so they told them to come to ED. Denies weakness, denies bleeding from anywhere       CC: Anemia  HPI:   28-year-old female presents ED after having outpatient laboratory workup done by her PCP that showed a hemoglobin of 6.7 patient states is the first time she has seen a primary care provider in several years, she does complain of feeling some fatigue otherwise she voices no other complaints.  Patient also had iron studies done, she denies any hemoptysis hematemesis hematochezia or melanotic stools.  She is being treated for a wound on her abdomen by wound care.  Patient denies having any fevers, chills.    Additional Limitations to History:   External Records Reviewed: I reviewed recent and relevant outside records including   History Obtained From:     Past Medical History: Per HPI  Medications: Reviewed in EMR and with patient  Allergies:  Reviewed in EMR  Past Surgical History:   Social History:     ------------------------------------------------------------------------------------------------------  Physical Exam:  --Vital signs reviewed in nursing triage note, EMR flow sheets, and at patient's bedside  GEN:  A&Ox3, no acute distress, appears comfortable.  Conversational and appropriate.  No confusion or gross mental status changes.  EYES: EOMI, non-injected sclera.  ENT: Moist mucous membranes, no apparent injuries or lesions.   CARDIO: Normal rate and regular rhythm. No murmurs, rubs, or gallops.  2+ equal pulses of the distal extremities.   PULM: Clear to auscultation bilaterally. No rales, rhonchi, or wheezes. Good symmetric chest expansion.  GI: Soft, non-tender, non-distended. No rebound tenderness or guarding.  SKIN: Warm and dry, no rashes or lesions.  MSK: ROM intact the extremities without contractures.   EXT: No peripheral edema, contusions, or wounds.   NEURO: Cranial  nerves II-XII grossly intact. Sensation to light touch intact and equal bilaterally in upper and lower extremities.  Symmetric 5/5 strength in upper and lower extremities.  PSYCH: Appropriate mood and behavior, converses and responds appropriately during exam.  -------------------------------------------------------------------------------------------------------        Differential Diagnoses Considered:   Chronic Medical Conditions Significantly Affecting Care:   Diagnostic testing considered: [PERC, D-Dimer, PECARN, etc.]      - I independently interpreted: [CXR, CT, POCUS, etc. including your interpretation]  - Labs notable for     Escalation of Care: Appropriate for   Social Determinants of Health Significantly Affecting Care: [Homelessness, lacking transportation, uninsured, unable to afford medications]  Prescription Drug Consideration: [Antibiotics, antivirals, pain medications, etc.]  Discussion of Management with Other Providers:  I discussed the patient/results with: [admitting team, consultant, radiologist, social work, EPAT, case management, PT/OT, RT, PCP, etc.]      Luther Woody PA-C                          [place] Coma Scale Score: 15                     Patient History   No past medical history on file.  No past surgical history on file.  No family history on file.  Social History     Tobacco Use    Smoking status: Never    Smokeless tobacco: Never   Substance Use Topics    Alcohol use: Never    Drug use: Never       Physical Exam   ED Triage Vitals [07/11/24 0856]   Temperature Heart Rate Respirations BP   37.1 °C (98.8 °F) 97 16 139/75      Pulse Ox Temp Source Heart Rate Source Patient Position   100 % Skin Monitor Lying      BP Location FiO2 (%)     Right arm --       Physical Exam    ED Course & MDM   Diagnoses as of 07/12/24 1351   Anemia, unspecified type       Medical Decision Making  28-year-old female with no significant past medical history presents to the ED after having outpatient labs  done by her PCP that showed hemoglobin of 6.7, repeat hemoglobin here 6.3, iron studies done outpatient consistent with iron deficiency anemia, patient minimally symptomatic, consent obtained and received 1 unit packed red blood cells patient is hemodynamically stable nontoxic-appearing afebrile in no acute distress she will be placed in observation overnight for monitoring trending hemoglobin.        Procedure  Procedures     Luther Woody PA-C  07/11/24 1014       Luther Woody PA-C  07/12/24 4595

## 2024-07-11 NOTE — PROGRESS NOTES
Pharmacy Medication History Review    Belinda Betancourt is a 28 y.o. female admitted for No Principal Problem: There is no principal problem currently on the Problem List. Please update the Problem List and refresh.. Pharmacy reviewed the patient's [id number] medications and allergies for accuracy.    The list below reflectives the updated PTA list. Please review each medication in order reconciliation for additional clarification and justification.  Prior to Admission Medications   Prescriptions Last Dose Informant Patient Reported? Taking?   LACTOBACILLUS ACIDOPHILUS ORAL 7/11/2024 at am Self Yes Yes   Sig: Take 1 tablet by mouth once daily in the morning. 1 gummy every morning   famotidine (Pepcid) 20 mg tablet Not Taking  No No   Sig: Take 1 tablet (20 mg) by mouth once daily for 15 days.   Patient not taking: Reported on 7/11/2024   ibuprofen 800 mg tablet Unknown Self Yes Yes   Sig: Take 1 tablet (800 mg) by mouth every 8 hours if needed for mild pain (1 - 3).      Facility-Administered Medications: None         The list below reflectives the updated allergy list. Please review each documented allergy for additional clarification and justification.  Allergies  Reviewed by Saúl Fajardo RN on 7/11/2024        Severity Reactions Comments    Clindamycin High Hives             Below are additional concerns with the patient's PTA list.      Idla Capone

## 2024-07-11 NOTE — H&P
Internal Medicine - History and Physical Note      Patient: Belinda Betancourt, Age: 28 y.o., SEX: female , MRN:[id number], ROOM:224/[facility], [facility],  Code: Full Code   Admitted On: 7/11/2024   Admitting Dx: Anemia, unspecified type [D64.9]  PCP: Cesia Barrios DO        Attending: Vicente Garnett MD        Chief Complaint   Patient:   Chief Complaint   Patient presents with    abnormal results     Got blood work done at PCP and blood work came back abnormal so they told them to come to ED. Denies weakness, denies bleeding from anywhere       HPI   Belinda Beatncourt is a 28 y.o. year old female  patient with no significant pmh who presented to the hospital for anemia. Patient presented to her PCP on the previous day for a regular check-up. The morning of admission, patient was contacted by her PCP to go to the hospital for a hemoglobin of 6.7. The patient had no symptoms at the time, but while waiting in the emergency department, she began to feel fatigued. The patient's hemoglobin was rechecked in the ED and was found to be 6.3. She received 1 unit of blood. The patient denied chest pain, shortness of breath, coughing, hematuria, melena, or menorrhagia. The patient has never been pregnant and has regular menses. She was recently treated for a MRSA+ abdominal soft tissue abscess on 06/19 and completed a course of doxycycline and prednisone.  ROS  Review of Systems   Constitutional:  Positive for fatigue. Negative for appetite change, chills, diaphoresis and fever.   HENT:  Negative for congestion, rhinorrhea and sore throat.    Eyes:  Negative for discharge and itching.   Respiratory:  Negative for apnea, cough, choking, chest tightness and shortness of breath.    Cardiovascular:  Negative for chest pain and palpitations.   Gastrointestinal:  Negative for abdominal distention, abdominal pain, blood in stool, constipation, diarrhea, nausea and vomiting.   Genitourinary:  Negative for difficulty urinating, dyspareunia, dysuria,  flank pain, hematuria, pelvic pain and vaginal bleeding.   Musculoskeletal:  Negative for back pain and gait problem.   Skin:  Positive for pallor.   Neurological:  Positive for light-headedness. Negative for tremors, seizures, syncope and numbness.   Psychiatric/Behavioral:  Negative for confusion and hallucinations.         12 Point ROS negative unless otherwise specified above.     ED COURSE:   VS: Temperature 98.8F , /75, heart rate 97 , respiration 16 , O2 sat 100 RA    Labs  - CBC: Hgb 6.7---->6.3, Hematocrit 24, Plt 350, WBC 7.0  - BMP: Glucose 74, Na 140, Potassium 3.5  - LFTs:  Bilirubin 0.2, Alp 42, ALT 11, AST 14  - Iron 13, TIBC 442, UIBC 429, Ferritin 8  - B12 267, Folate 10.2  -TSH 0.7  - A1c 5.7    Imaging:  CXR: Right lower lobe airspace opacity, concerning for pneumonia    Interventions:   -Type and Screen in ED, received 1 unit transfusion of RBC    Past Medical History: No significant  Past Surgical History: No significant  Allergies: Clindamycin  Family History: Father: CHF, T2DM Mother:  of heart attack  Home Meds: Probiotic    Social History:  - Coming from Patient  - Tobacco: Never  - Alcohol: None  - Illicit Drug: Never    HealthCare Providers:  - PCP: Cesia Barrios DO     Code Status: Full    Emergency contact: Extended Emergency Contact Information  Primary Emergency Contact: HALLIE BETANCOURT  Address: [id number] [place] [address] [address], [facility] [facility]  Home Phone: [phone]  Work Phone: [phone]  Mobile Phone: [phone]  Relation: Sister   needed? No  Secondary Emergency Contact: Cherelle Betancourt  Home Phone: [phone]  Relation: Parent     Past Medical History   No past medical history on file.     Surgical History   No past surgical history on file.    Family History   No family history on file.    Social History     Social History     Socioeconomic History    Marital status: Single     Spouse name: Not on file    Number of children:  Not on file    Years of education: Not on file    Highest education level: Not on file   Occupational History    Not on file   Tobacco Use    Smoking status: Never    Smokeless tobacco: Never   Substance and Sexual Activity    Alcohol use: Never    Drug use: Never    Sexual activity: Not on file   Other Topics Concern    Not on file   Social History Narrative    Not on file     Social Determinants of Health     Financial Resource Strain: Low Risk  (7/11/2024)    Overall Financial Resource Strain (CARDIA)     Difficulty of Paying Living Expenses: Not very hard   Food Insecurity: Not on file   Transportation Needs: No Transportation Needs (7/11/2024)    PRAPARE - Transportation     Lack of Transportation (Medical): No     Lack of Transportation (Non-Medical): No   Physical Activity: Not on file   Stress: Not on file   Social Connections: Not on file   Intimate Partner Violence: Not on file   Housing Stability: Low Risk  (7/11/2024)    Housing Stability Vital Sign     Unable to Pay for Housing in the Last Year: No     Number of Times Moved in the Last Year: 0     Homeless in the Last Year: No       Tobacco Use: Low Risk  (7/10/2024)    Patient History     Smoking Tobacco Use: Never     Smokeless Tobacco Use: Never     Passive Exposure: Not on file        Social History     Substance and Sexual Activity   Alcohol Use Never        Allergies     Allergies   Allergen Reactions    Clindamycin Hives          Meds    Scheduled medications  lactobacillus acidophilus, 1 capsule, oral, Daily  polyethylene glycol, 17 g, oral, Daily      Continuous medications     PRN medications       Objective    Physical Exam  Constitutional:       General: She is not in acute distress.     Appearance: Normal appearance.   HENT:      Head: Normocephalic and atraumatic.      Right Ear: External ear normal.      Left Ear: External ear normal.      Nose: Nose normal.      Mouth/Throat:      Mouth: Mucous membranes are moist.   Eyes:      General:  "No scleral icterus.     Extraocular Movements: Extraocular movements intact.      Comments: Pale conjunctiva     Cardiovascular:      Rate and Rhythm: Normal rate and regular rhythm.      Pulses: Normal pulses.      Heart sounds: Normal heart sounds. No murmur heard.     No friction rub. No gallop.   Pulmonary:      Effort: Pulmonary effort is normal. No respiratory distress.      Breath sounds: Normal breath sounds. No stridor. No wheezing or rhonchi.   Abdominal:      General: Abdomen is flat. There is no distension.      Palpations: Abdomen is soft.      Tenderness: There is no abdominal tenderness. There is no guarding or rebound.   Musculoskeletal:         General: No swelling, tenderness or signs of injury. Normal range of motion.      Cervical back: Normal range of motion. No rigidity or tenderness.   Skin:     Coloration: Skin is pale.   Neurological:      General: No focal deficit present.      Mental Status: She is alert and oriented to person, place, and time.   Psychiatric:         Mood and Affect: Mood normal.          Visit Vitals  /67   Pulse 64   Temp 36.7 °C (98.1 °F)   Resp 16   Ht 1.549 m (5' 1\")   Wt 56.2 kg (124 lb)   SpO2 100%   BMI 23.43 kg/m²   Smoking Status Never   BSA 1.56 m²        No intake or output data in the 24 hours ending 07/11/24 1452          Labs:   Results from last 72 hours   Lab Units 07/11/24  0947 07/10/24  0837   SODIUM mmol/L 140 138   POTASSIUM mmol/L 3.5 4.0   CHLORIDE mmol/L 108* 109*   CO2 mmol/L 24 24   BUN mg/dL 13 14   CREATININE mg/dL 0.44* 0.49*   GLUCOSE mg/dL 74 77   CALCIUM mg/dL 8.9 8.7   ANION GAP mmol/L 12 9*   EGFR mL/min/1.73m*2 >90 >90      Results from last 72 hours   Lab Units 07/11/24  0947 07/10/24  0837   WBC AUTO x10*3/uL 7.0 6.2   HEMOGLOBIN g/dL 6.3* 6.7*   HEMATOCRIT % 24.0* 26.6*   PLATELETS AUTO x10*3/uL 350 370   NEUTROS PCT AUTO % 66.0  --    LYMPHS PCT AUTO % 20.1  --    MONOS PCT AUTO % 10.1  --    EOS PCT AUTO % 3.1  --       Lab " "Results   Component Value Date    CALCIUM 8.9 07/11/2024      No results found for: \"CRP\"      Micro/ID:   Susceptibility data from last 90 days.  Collected Specimen Info Organism Clindamycin Erythromycin Oxacillin Tetracycline Trimethoprim/Sulfamethoxazole Vancomycin   06/18/24 Tissue/Biopsy from Skin/Superficial Abscess Staphylococcus aureus         06/17/24 Tissue/Biopsy from Skin/Superficial Abscess Methicillin Resistant Staphylococcus aureus (MRSA)  S  R  R  S  S  S     No results found for: \"URINECULTURE\", \"BLOODCULT\", \"CSFCULTSMEAR\"  Results from last 7 days   Lab Units 07/10/24  0837   HEP C AB  Nonreactive                No lab exists for component: \"AGALPCRNB\"       Images    XR chest 1 view  Narrative: Interpreted By:  Se Moffett,   STUDY:  XR CHEST 1 VIEW;  7/11/2024 12:14 pm      INDICATION:  Signs/Symptoms:anemia.      COMPARISON:  08/30/2021      ACCESSION NUMBER(S):  [id number]      ORDERING CLINICIAN:  CHANDANA ANTONIO      FINDINGS:  Two-view chest      DEVICES:  None.      CARDIOMEDIASTINAL SILHOUETTE:  Cardiomediastinal silhouette is normal in size and configuration.No  significant atherosclerotic calcification.      LUNGS:  Patchy opacities right lower lobe concerning for pneumonia. No  pneumothorax. No pleural effusion.      BONES:  No acute osseous changes.      Impression: 1.  Right lower lobe airspace opacities, concerning for pneumonia.          Signed by: Se Moffett 7/11/2024 12:51 PM  Dictation workstation:   [id number]       No results found for this or any previous visit (from the past 4464 hour(s)).   No results found for this or any previous visit (from the past 4464 hour(s)).         Assessment and Plan    Belinda Betancourt is a 28 y.o. female admitted on 7/11/2024 with no significant pmh who presented to the hospital for anemia.     ACUTE MEDICAL ISSUES:  #Iron-Deficiency Anemia  #Fatigue  - H&H upon admission: 6.7--->6.3 in the ED  - pRBC transfusion;1unit__  - Repeat " H&H s/p 1 pRBC transfusion; pending  - History of fatigue  - Manual smear; unremarkable  - Microcytic MCV: 63  - Platelet Count: 370  - Reticulocyte count; pending  - Iron; 13  - TIBC;442  - Ferritin; 8  - Transferrin Saturation 3%  - Bilirubin 0.2  - Haptoglobin; pending  Plan:  -Patient received 1 unit of RBC in the ED  -Monitor CBC 3 hours after receive transfusion  -Haptoglobin, Bilirubin, Lactate, LDH, Reticulocytes pending    Fluids: 1 unit RBC  Electrolytes: Replete PRN  Nutrition: Adult Regular diet  Antimicrobials: None  DVT ppx: None indicated  GI ppx: None indicated  Catheter: None  Lines: 20G PIV R  Supplemental Oxygen: RA  Emergency Contact: Extended Emergency Contact Information  Primary Emergency Contact: HALLIE BETANCOURT  Address: [address] [place] [address]            [place], [address]  Home Phone: [phone]  Work Phone: [phone]  Mobile Phone: [phone]  Relation: Sister   needed? No  Secondary Emergency Contact: Cherelle Betancourt  Home Phone: [phone]  Relation: Parent   Code: Full Code     Disposition: Pending improved Hgb ELOS< 2 days    Jett Wyatt DO  Internal Medicine, PGY- 1  07/11/24 at 2:52 PM

## 2024-07-11 NOTE — ED TRIAGE NOTES
Patient here for abnormal labs Got blood work done at PCP and blood work came back abnormal so they told them to come to ED. Denies weakness, denies bleeding from anywhere

## 2024-07-12 ENCOUNTER — PHARMACY VISIT (OUTPATIENT)
Dept: PHARMACY | Facility: CLINIC | Age: 29
End: 2024-07-12
Payer: MEDICAID

## 2024-07-12 VITALS
WEIGHT: 124 LBS | HEART RATE: 65 BPM | DIASTOLIC BLOOD PRESSURE: 62 MMHG | SYSTOLIC BLOOD PRESSURE: 105 MMHG | HEIGHT: 61 IN | OXYGEN SATURATION: 97 % | RESPIRATION RATE: 18 BRPM | TEMPERATURE: 98.1 F | BODY MASS INDEX: 23.41 KG/M2

## 2024-07-12 LAB
ALBUMIN SERPL BCP-MCNC: 3.7 G/DL (ref 3.4–5)
ALP SERPL-CCNC: 38 U/L (ref 33–110)
ALT SERPL W P-5'-P-CCNC: 7 U/L (ref 7–45)
ANA SER QL HEP2 SUBST: NEGATIVE
ANION GAP SERPL CALC-SCNC: 12 MMOL/L (ref 10–20)
AST SERPL W P-5'-P-CCNC: 14 U/L (ref 9–39)
BILIRUB SERPL-MCNC: 0.3 MG/DL (ref 0–1.2)
BUN SERPL-MCNC: 8 MG/DL (ref 6–23)
CALCIUM SERPL-MCNC: 8.6 MG/DL (ref 8.6–10.3)
CHLORIDE SERPL-SCNC: 110 MMOL/L (ref 98–107)
CO2 SERPL-SCNC: 18 MMOL/L (ref 21–32)
CREAT SERPL-MCNC: 0.43 MG/DL (ref 0.5–1.05)
EGFRCR SERPLBLD CKD-EPI 2021: >90 ML/MIN/1.73M*2
ERYTHROCYTE [DISTWIDTH] IN BLOOD BY AUTOMATED COUNT: 23.7 % (ref 11.5–14.5)
GLIADIN PEPTIDE IGG SER IA-ACNC: <0.56 FLU (ref 0–4.99)
GLUCOSE SERPL-MCNC: 93 MG/DL (ref 74–99)
HAPTOGLOB SERPL-MCNC: 84 MG/DL (ref 37–246)
HCT VFR BLD AUTO: 27.6 % (ref 36–46)
HGB BLD-MCNC: 7.6 G/DL (ref 12–16)
MAGNESIUM SERPL-MCNC: 1.98 MG/DL (ref 1.6–2.4)
MCH RBC QN AUTO: 17.6 PG (ref 26–34)
MCHC RBC AUTO-ENTMCNC: 27.5 G/DL (ref 32–36)
MCV RBC AUTO: 64 FL (ref 80–100)
NRBC BLD-RTO: 0 /100 WBCS (ref 0–0)
PHOSPHATE SERPL-MCNC: 3.2 MG/DL (ref 2.5–4.9)
PLATELET # BLD AUTO: 314 X10*3/UL (ref 150–450)
POTASSIUM SERPL-SCNC: 4 MMOL/L (ref 3.5–5.3)
PROT SERPL-MCNC: 6.2 G/DL (ref 6.4–8.2)
RBC # BLD AUTO: 4.33 X10*6/UL (ref 4–5.2)
SODIUM SERPL-SCNC: 136 MMOL/L (ref 136–145)
TTG IGG SER IA-ACNC: <0.82 FLU (ref 0–4.99)
WBC # BLD AUTO: 9 X10*3/UL (ref 4.4–11.3)

## 2024-07-12 PROCEDURE — 84100 ASSAY OF PHOSPHORUS: CPT

## 2024-07-12 PROCEDURE — 80053 COMPREHEN METABOLIC PANEL: CPT

## 2024-07-12 PROCEDURE — G0378 HOSPITAL OBSERVATION PER HR: HCPCS

## 2024-07-12 PROCEDURE — 2500000001 HC RX 250 WO HCPCS SELF ADMINISTERED DRUGS (ALT 637 FOR MEDICARE OP)

## 2024-07-12 PROCEDURE — 36415 COLL VENOUS BLD VENIPUNCTURE: CPT

## 2024-07-12 PROCEDURE — 99236 HOSP IP/OBS SAME DATE HI 85: CPT

## 2024-07-12 PROCEDURE — RXMED WILLOW AMBULATORY MEDICATION CHARGE

## 2024-07-12 PROCEDURE — 83735 ASSAY OF MAGNESIUM: CPT

## 2024-07-12 PROCEDURE — 96366 THER/PROPH/DIAG IV INF ADDON: CPT

## 2024-07-12 PROCEDURE — 85027 COMPLETE CBC AUTOMATED: CPT

## 2024-07-12 PROCEDURE — 2500000004 HC RX 250 GENERAL PHARMACY W/ HCPCS (ALT 636 FOR OP/ED): Performed by: STUDENT IN AN ORGANIZED HEALTH CARE EDUCATION/TRAINING PROGRAM

## 2024-07-12 RX ORDER — FERROUS SULFATE 325(65) MG
325 TABLET ORAL
Qty: 90 TABLET | Refills: 1 | Status: SHIPPED | OUTPATIENT
Start: 2024-07-12

## 2024-07-12 RX ORDER — PANTOPRAZOLE SODIUM 40 MG/1
40 TABLET, DELAYED RELEASE ORAL DAILY
Qty: 30 TABLET | Refills: 1 | Status: SHIPPED | OUTPATIENT
Start: 2024-07-12 | End: 2024-09-10

## 2024-07-12 ASSESSMENT — COGNITIVE AND FUNCTIONAL STATUS - GENERAL
DAILY ACTIVITIY SCORE: 24
MOBILITY SCORE: 24
MOBILITY SCORE: 24
DAILY ACTIVITIY SCORE: 24

## 2024-07-12 ASSESSMENT — PAIN SCALES - GENERAL: PAINLEVEL_OUTOF10: 0 - NO PAIN

## 2024-07-12 NOTE — CARE PLAN
The patient's goals for the shift include  feel better    The clinical goals for the shift include pt hemoglobin will remain wnl during shift      Problem: Fall/Injury  Goal: Not fall by end of shift  Outcome: Progressing     Problem: Fall/Injury  Goal: Be free from injury by end of the shift  Outcome: Progressing     Problem: Fall/Injury  Goal: Verbalize understanding of personal risk factors for fall in the hospital  Outcome: Progressing     Problem: Fall/Injury  Goal: Verbalize understanding of risk factor reduction measures to prevent injury from fall in the home  Outcome: Progressing

## 2024-07-12 NOTE — CONSULTS
"  Wound Care Consult     Visit Date: 7/12/2024      Patient Name: Belinda Betancourt         MRN: [id number]           YOB: 1995     Reason for Consult:   Right abdominal wound     Wound History:   Soft tissue abscess right lower abdomen (MRSA+) 6-19-24     Pertinent Labs:   Albumin   Date Value Ref Range Status   07/12/2024 3.7 3.4 - 5.0 g/dL Final       Wound Assessment:  Wound 06/19/24 Right (Active)       Wound 07/11/24 Abdomen Lower;Medial;Right (Active)       Wound Team Summary Assessment:   Patient reports that \"they finally got me the right antibiotic and now it is healing\".  She has been going to the [facility] where the orders are for every other day wound care with Hydrofera blue and Mepilex.  Her sister has been doing the care for her at home.  The wound is 1 cm linear, no drainage, healing with no SOI.  Orders for same care were obtained from attending provider and care was done.  Patient to return to the Wound Center, it appears this will soon be healed!     Wound Team Plan:  Hydrofera Blue and Mepilex, change every other day.     Sergey Britton RN  7/12/2024  11:58 AM        "

## 2024-07-12 NOTE — DISCHARGE INSTRUCTIONS
For your iron deficiency anemia:  -Iron tablets have been prescribed.  Take 1 on Monday, Wednesday, and Friday.  -The antacid pantoprazole has been prescribed in case stomach inflammation is the cause of anemia.  -Referrals have been placed with hematology, gastroenterology, and gynecology for further evaluation of potential sources of blood loss.

## 2024-07-12 NOTE — HOSPITAL COURSE
Belinda Betancourt is a 28 y.o. year old female  patient with no significant pmh who presented to the hospital for anemia. Patient presented to her PCP on the previous day for a regular check-up. The morning of admission, patient was contacted by her PCP to go to the hospital for a hemoglobin of 6.7. The patient had no symptoms at the time, but while waiting in the emergency department, she began to feel fatigued. The patient's hemoglobin was rechecked in the ED and was found to be 6.3. She received 1 unit of blood and was admitted for observation. Iron studies performed at PCP office consistent with iron deficiency anemia. Hemolysis workup was performed which was unremarkable. Her Hgb incremented appropriately. She was given 2 doses of IV Venofer. Patient was medically stable and discharged home on 7/12/24. Instructed to follow up with PCP, GI, Heme/Onc, OB/Gyn, and Dietician. New scripts at time of discharge: Protonix 40mg daily, Ferrous sulfate 325mg M/W/F.

## 2024-07-12 NOTE — CONSULTS
"Nutrition Assessment Note  Nutrition Assessment      Reason for Assessment  Reason for Assessment: Provider consult order (education)    History:  Food and Nutrient History  Energy Intake: Good > 75 %  Food and Nutrient History: Pt reports her appetite is good, requesting information on iron deficiency anemia. Noted plan for discharge to home today.       Anthropometrics:  Height: 154.9 cm (5' 1\")  Weight: 56.2 kg (124 lb)  BMI (Calculated): 23.44         Education Documentation  List of foods high in iron; iron deficiency anemia nutrition therapy, taught by Sruthi Encarnacion RD at 7/12/2024 11:43 AM.  Learner: Patient  Readiness: Acceptance  Method: Explanation, Handout  Response: Verbalizes Understanding, Needs Reinforcement                  Follow Up  Time Spent (min): 30 minutes  Last Date of Nutrition Visit: 07/12/24  Nutrition Follow-Up Needed?: Dietitian to reassess per policy       "

## 2024-07-12 NOTE — DISCHARGE SUMMARY
Discharge Diagnosis  Iron deficiency anemia     Issues Requiring Follow-Up  Follow up with OB/GYN   Follow up with GI   Follow up with Heme/Onc     Discharge Meds     Your medication list        START taking these medications        Instructions Last Dose Given Next Dose Due   ferrous sulfate (325 mg ferrous sulfate) tablet      Take 1 tablet by mouth once a day on Monday, Wednesday, and Friday.       pantoprazole 40 mg EC tablet  Commonly known as: ProtoNix      Take 1 tablet (40 mg) by mouth once daily. Do not crush, chew, or split.              CONTINUE taking these medications        Instructions Last Dose Given Next Dose Due   LACTOBACILLUS ACIDOPHILUS ORAL                  STOP taking these medications      famotidine 20 mg tablet  Commonly known as: Pepcid        ibuprofen 800 mg tablet                  Where to Get Your Medications        These medications were sent to [facility]  [address], [place] [address]      Hours: 9 AM to 5 PM Mon-Fri Phone: [phone]   ferrous sulfate (325 mg ferrous sulfate) tablet  pantoprazole 40 mg EC tablet         Test Results Pending At Discharge  Pending Labs       No current pending labs.            Hospital Course  Belinda Betancourt is a 28 y.o. year old female  patient with no significant pmh who presented to the hospital for anemia. Patient presented to her PCP on the previous day for a regular check-up. The morning of admission, patient was contacted by her PCP to go to the hospital for a hemoglobin of 6.7. The patient had no symptoms at the time, but while waiting in the emergency department, she began to feel fatigued. The patient's hemoglobin was rechecked in the ED and was found to be 6.3. She received 1 unit of blood and was admitted for observation. Iron studies performed at PCP office consistent with iron deficiency anemia. Hemolysis workup was performed which was unremarkable. Her Hgb incremented appropriately. She was given 2 doses of IV  Venofer. Patient was medically stable and discharged home on 7/12/24. Instructed to follow up with PCP, GI, Heme/Onc, OB/Gyn, and Dietician. New scripts at time of discharge: Protonix 40mg daily, Ferrous sulfate 325mg M/W/F.     Pertinent Physical Exam At Time of Discharge  Physical Exam  Constitutional:       General: She is not in acute distress.     Appearance: Normal appearance.   Cardiovascular:      Rate and Rhythm: Normal rate and regular rhythm.      Heart sounds: Normal heart sounds. No murmur heard.     No friction rub. No gallop.   Pulmonary:      Effort: Pulmonary effort is normal. No respiratory distress.      Breath sounds: Normal breath sounds. No wheezing, rhonchi or rales.   Abdominal:      General: Abdomen is flat. Bowel sounds are normal. There is no distension.      Palpations: Abdomen is soft.      Tenderness: There is no abdominal tenderness.   Musculoskeletal:         General: No swelling or tenderness. Normal range of motion.   Skin:     General: Skin is warm and dry.      Findings: No erythema or rash.   Neurological:      General: No focal deficit present.      Mental Status: She is alert and oriented to person, place, and time. Mental status is at baseline.   Psychiatric:         Mood and Affect: Mood normal.         Behavior: Behavior normal.         Outpatient Follow-Up  Future Appointments   Date Time Provider Department Center   7/16/2024  7:30 AM [facility] 2D WOUND, [id number] Srinivas Duran DO

## 2024-07-15 ENCOUNTER — PATIENT OUTREACH (OUTPATIENT)
Dept: PRIMARY CARE | Facility: CLINIC | Age: 29
End: 2024-07-15
Payer: MEDICAID

## 2024-07-15 NOTE — PROGRESS NOTES
Her sister said she has 3 other appointments scheduled in the next two weeks and will call back to schedule when she can figure out a day she can.

## 2024-07-15 NOTE — PROGRESS NOTES
Discharge Facility: [facility]  Discharge Diagnosis: Anema  Admission Date: 11 July 24  Discharge Date: 12 July 24    PCP Appointment Date: Tasked to office for scheduling.   Specialist Appointment Date: 16 July 24 (Pb, Leroy)  Hospital Encounter and Summary Linked: Yes    See discharge assessment below for further details     Engagement  Call Start Time: 1046 (7/15/2024 10:56 AM)    Medications  Medications reviewed with patient/caregiver?: Yes (7/15/2024 10:56 AM)  Is the patient having any side effects they believe may be caused by any medication additions or changes?: No (7/15/2024 10:56 AM)  Does the patient have all medications ordered at discharge?: Yes (7/15/2024 10:56 AM)  Prescription Comments: None (7/15/2024 10:56 AM)  Is the patient taking all medications as directed (includes completed medication regime)?: Yes (7/15/2024 10:56 AM)  Medication Comments: None (7/15/2024 10:56 AM)    Appointments  Does the patient have a primary care provider?: Yes (Tasked to office for scheduling.) (7/15/2024 10:56 AM)  Has the patient kept scheduled appointments due by today?: Yes (7/15/2024 10:56 AM)    Self Management  What is the home health agency?: N/A (7/15/2024 10:56 AM)  Has home health visited the patient within 72 hours of discharge?: Not applicable (7/15/2024 10:56 AM)  What Durable Medical Equipment (DME) was ordered?: N/A (7/15/2024 10:56 AM)    Patient Teaching  Does the patient have access to their discharge instructions?: Yes (7/15/2024 10:56 AM)  Care Management Interventions: Reviewed instructions with patient (7/15/2024 10:56 AM)  What is the patient's perception of their health status since discharge?: Improving (7/15/2024 10:56 AM)  Is the patient/caregiver able to teach back the hierarchy of who to call/visit for symptoms/problems? PCP, Specialist, Home Health nurse, Urgent Care, ED, 911: Yes (7/15/2024 10:56 AM)  Patient/Caregiver Education Comments: N/A (7/15/2024 10:56 AM)    Wrap Up  Wrap Up  Additional Comments: Pt's sister requesting a prescription for Ensure for patient if possible. will message PCP about this request for her consideration (7/15/2024 10:56 AM)  Call End Time: 1056 (7/15/2024 10:56 AM)    Pt's sister grateful for outreach call and is agreeable to being contacted in 2 wks to see how they are doing. Sister request for prescription forwarded to PCP for consideration.

## 2024-07-16 ENCOUNTER — OFFICE VISIT (OUTPATIENT)
Dept: WOUND CARE | Facility: CLINIC | Age: 29
End: 2024-07-16
Payer: MEDICAID

## 2024-07-16 LAB — VIT B1 PYROPHOSHATE BLD-SCNC: 113 NMOL/L (ref 70–180)

## 2024-07-16 PROCEDURE — 99212 OFFICE O/P EST SF 10 MIN: CPT

## 2024-07-18 ENCOUNTER — HOSPITAL ENCOUNTER (OUTPATIENT)
Dept: CARDIOLOGY | Facility: HOSPITAL | Age: 29
Discharge: HOME | End: 2024-07-18
Payer: MEDICAID

## 2024-07-18 DIAGNOSIS — R01.1 HEART MURMUR, SYSTOLIC: ICD-10-CM

## 2024-07-18 LAB
EJECTION FRACTION APICAL 4 CHAMBER: 74.7
EJECTION FRACTION: 72 %
LEFT ATRIUM VOLUME AREA LENGTH INDEX BSA: 29.4 ML/M2
LEFT VENTRICLE INTERNAL DIMENSION DIASTOLE: 4.2 CM (ref 3.5–6)
LEFT VENTRICULAR OUTFLOW TRACT DIAMETER: 1.7 CM
LV EJECTION FRACTION BIPLANE: 72 %
MITRAL VALVE E/A RATIO: 2.05
MITRAL VALVE E/E' RATIO: 7.03
RIGHT VENTRICLE FREE WALL PEAK S': 17 CM/S
RIGHT VENTRICLE PEAK SYSTOLIC PRESSURE: 23.1 MMHG
TRICUSPID ANNULAR PLANE SYSTOLIC EXCURSION: 2.9 CM

## 2024-07-18 PROCEDURE — 93306 TTE W/DOPPLER COMPLETE: CPT | Performed by: INTERNAL MEDICINE

## 2024-07-18 PROCEDURE — 93306 TTE W/DOPPLER COMPLETE: CPT

## 2024-07-23 ENCOUNTER — APPOINTMENT (OUTPATIENT)
Dept: WOUND CARE | Facility: CLINIC | Age: 29
End: 2024-07-23
Payer: MEDICAID

## 2024-07-24 ENCOUNTER — LAB (OUTPATIENT)
Dept: LAB | Facility: LAB | Age: 29
End: 2024-07-24
Payer: MEDICAID

## 2024-07-24 ENCOUNTER — APPOINTMENT (OUTPATIENT)
Dept: PRIMARY CARE | Facility: CLINIC | Age: 29
End: 2024-07-24
Payer: MEDICAID

## 2024-07-24 VITALS
TEMPERATURE: 98.6 F | SYSTOLIC BLOOD PRESSURE: 116 MMHG | DIASTOLIC BLOOD PRESSURE: 78 MMHG | HEART RATE: 87 BPM | HEIGHT: 61 IN | WEIGHT: 134 LBS | BODY MASS INDEX: 25.3 KG/M2

## 2024-07-24 DIAGNOSIS — D64.9 ANEMIA, UNSPECIFIED TYPE: Primary | ICD-10-CM

## 2024-07-24 DIAGNOSIS — D64.9 ANEMIA, UNSPECIFIED TYPE: ICD-10-CM

## 2024-07-24 DIAGNOSIS — R21 RASH: ICD-10-CM

## 2024-07-24 DIAGNOSIS — E44.1 MILD PROTEIN-CALORIE MALNUTRITION (MULTI): ICD-10-CM

## 2024-07-24 DIAGNOSIS — Z09 HOSPITAL DISCHARGE FOLLOW-UP: ICD-10-CM

## 2024-07-24 LAB
BASOPHILS # BLD AUTO: 0.04 X10*3/UL (ref 0–0.1)
BASOPHILS NFR BLD AUTO: 0.6 %
EOSINOPHIL # BLD AUTO: 0.21 X10*3/UL (ref 0–0.7)
EOSINOPHIL NFR BLD AUTO: 3.2 %
ERYTHROCYTE [DISTWIDTH] IN BLOOD BY AUTOMATED COUNT: ABNORMAL %
HCT VFR BLD AUTO: 37.3 % (ref 36–46)
HGB BLD-MCNC: 10.6 G/DL (ref 12–16)
HYPOCHROMIA BLD QL SMEAR: NORMAL
IMM GRANULOCYTES # BLD AUTO: 0.04 X10*3/UL (ref 0–0.7)
IMM GRANULOCYTES NFR BLD AUTO: 0.6 % (ref 0–0.9)
LYMPHOCYTES # BLD AUTO: 1.23 X10*3/UL (ref 1.2–4.8)
LYMPHOCYTES NFR BLD AUTO: 19 %
MCH RBC QN AUTO: 20.8 PG (ref 26–34)
MCHC RBC AUTO-ENTMCNC: 28.4 G/DL (ref 32–36)
MCV RBC AUTO: 73 FL (ref 80–100)
MONOCYTES # BLD AUTO: 0.55 X10*3/UL (ref 0.1–1)
MONOCYTES NFR BLD AUTO: 8.5 %
NEUTROPHILS # BLD AUTO: 4.42 X10*3/UL (ref 1.2–7.7)
NEUTROPHILS NFR BLD AUTO: 68.1 %
NRBC BLD-RTO: 0 /100 WBCS (ref 0–0)
OVALOCYTES BLD QL SMEAR: NORMAL
PLATELET # BLD AUTO: 377 X10*3/UL (ref 150–450)
RBC # BLD AUTO: 5.1 X10*6/UL (ref 4–5.2)
RBC MORPH BLD: NORMAL
TARGETS BLD QL SMEAR: NORMAL
WBC # BLD AUTO: 6.5 X10*3/UL (ref 4.4–11.3)

## 2024-07-24 PROCEDURE — 36415 COLL VENOUS BLD VENIPUNCTURE: CPT

## 2024-07-24 PROCEDURE — 83021 HEMOGLOBIN CHROMOTOGRAPHY: CPT

## 2024-07-24 PROCEDURE — 85025 COMPLETE CBC W/AUTO DIFF WBC: CPT

## 2024-07-24 PROCEDURE — 99495 TRANSJ CARE MGMT MOD F2F 14D: CPT | Performed by: STUDENT IN AN ORGANIZED HEALTH CARE EDUCATION/TRAINING PROGRAM

## 2024-07-24 PROCEDURE — 87476 LYME DIS DNA AMP PROBE: CPT

## 2024-07-24 PROCEDURE — 3008F BODY MASS INDEX DOCD: CPT | Performed by: STUDENT IN AN ORGANIZED HEALTH CARE EDUCATION/TRAINING PROGRAM

## 2024-07-24 ASSESSMENT — ENCOUNTER SYMPTOMS
SHORTNESS OF BREATH: 0
FEVER: 1
FATIGUE: 1
NEUROLOGICAL NEGATIVE: 1
HEMATOLOGIC/LYMPHATIC NEGATIVE: 1
CHEST TIGHTNESS: 0
GASTROINTESTINAL NEGATIVE: 1
PALPITATIONS: 0
PSYCHIATRIC NEGATIVE: 1

## 2024-07-24 ASSESSMENT — PATIENT HEALTH QUESTIONNAIRE - PHQ9
2. FEELING DOWN, DEPRESSED OR HOPELESS: NOT AT ALL
1. LITTLE INTEREST OR PLEASURE IN DOING THINGS: NOT AT ALL
SUM OF ALL RESPONSES TO PHQ9 QUESTIONS 1 AND 2: 0

## 2024-07-24 NOTE — PROGRESS NOTES
"Patient: Belinda Betancourt  : 1995  PCP: Cesia Barrios DO  MRN: [id number]  Program: Transitional Care Management  Status: Enrolled  Effective Dates: 7/15/2024 - present  Responsible Staff: Tony Mckeon RN  Social Determinants to be Addressed: No information to display         Belinda Betancourt is a 28 y.o. female presenting today for follow-up after being discharged from the hospital 10 days ago. The main problem requiring admission was anemia. The discharge summary and/or Transitional Care Management documentation was reviewed. Medication reconciliation was performed as indicated via the \"Ender as Reviewed\" timestamp.     Belinda Betancourt was contacted by Transitional Care Management services two days after her discharge. This encounter and supporting documentation was reviewed.    Patient is accompanied by her sister who she is currently living with.  Sister notes occasional elevated temperatures up to 100 for the past few days since being discharged from the hospital.  Declines any associated chills, palpitations, night sweats.  Temperature in office today at 98.6.  There is a possibility that the thermometer they using at home may be inaccurate.  She continues to have cold hands and feet.    Patient does have some nonpruritic papules on her bilateral hands have been ongoing for quite some time now per sister.  They were talking with some family and friends who believe they should be evaluated for Lyme disease.    Sister asking about Ensure clears versus Ensure plus for nutrition.  Has been drinking Ensure clears to avoid any decreased absorption of iron caused by milk products.    Review of Systems   Constitutional:  Positive for fatigue and fever.   HENT: Negative.     Respiratory:  Negative for chest tightness and shortness of breath.    Cardiovascular:  Negative for chest pain and palpitations.   Gastrointestinal: Negative.    Skin:  Positive for rash.   Neurological: Negative.    Hematological: " "Negative.    Psychiatric/Behavioral: Negative.         /78   Pulse 87   Temp 37 °C (98.6 °F)   Ht 1.549 m (5' 1\")   Wt 60.8 kg (134 lb)   BMI 25.32 kg/m²     Physical Exam  Constitutional:       General: She is not in acute distress.     Appearance: Normal appearance.   HENT:      Head: Normocephalic and atraumatic.      Right Ear: External ear normal.      Left Ear: External ear normal.      Nose: Nose normal.   Eyes:      Extraocular Movements: Extraocular movements intact.      Conjunctiva/sclera: Conjunctivae normal.   Cardiovascular:      Rate and Rhythm: Normal rate and regular rhythm.      Heart sounds: Normal heart sounds. No murmur heard.  Pulmonary:      Effort: Pulmonary effort is normal. No respiratory distress.      Breath sounds: Normal breath sounds. No rhonchi.   Abdominal:      General: Abdomen is flat.   Musculoskeletal:         General: No swelling.   Skin:     General: Skin is warm.      Comments: Papular rash on bilateral hands   Neurological:      Mental Status: She is alert. Mental status is at baseline.         The complexity of medical decision making for this patient's transitional care i (s moderate.    Assessment/Plan   Diagnoses and all orders for this visit:  Anemia, unspecified type  - We will continue to workup your anemia, a CBC has been ordered to recheck your.  In addition we will order hemoglobin identification.  You have an appointment scheduled with hematology on October 1 and are on the wait list.  In addition you have a appointment with GI next week  - Advised to drink Ensure Plus instead of Ensure clears as the Ensure clears do not have enough protein needed.  - A Lyme disease PCR has been ordered for you  -     CBC and Auto Differential; Future  -     Hemoglobin Identification with Path Review; Future  -     Lyme disease, PCR; Future  -     Oral nutritional supplements  Hospital discharge follow-up  Rash  Advised to make an appointment with dermatology    We will " follow-up after your GI and hematology appointments, or sooner if needed.

## 2024-07-25 ENCOUNTER — OFFICE VISIT (OUTPATIENT)
Dept: DERMATOLOGY | Facility: CLINIC | Age: 29
End: 2024-07-25
Payer: MEDICAID

## 2024-07-25 ENCOUNTER — PATIENT OUTREACH (OUTPATIENT)
Dept: HEMATOLOGY/ONCOLOGY | Facility: HOSPITAL | Age: 29
End: 2024-07-25

## 2024-07-25 ENCOUNTER — TELEPHONE (OUTPATIENT)
Dept: DERMATOLOGY | Facility: CLINIC | Age: 29
End: 2024-07-25

## 2024-07-25 DIAGNOSIS — R21 RASH AND OTHER NONSPECIFIC SKIN ERUPTION: Primary | ICD-10-CM

## 2024-07-25 DIAGNOSIS — L30.9 HAND ECZEMA: ICD-10-CM

## 2024-07-25 PROCEDURE — 11104 PUNCH BX SKIN SINGLE LESION: CPT | Performed by: DERMATOLOGY

## 2024-07-25 PROCEDURE — 99214 OFFICE O/P EST MOD 30 MIN: CPT | Performed by: DERMATOLOGY

## 2024-07-25 RX ORDER — FEEDING PUMP
EACH MISCELLANEOUS
Qty: 990 ML | Refills: 11 | Status: SHIPPED | OUTPATIENT
Start: 2024-07-25

## 2024-07-25 ASSESSMENT — ITCH NUMERIC RATING SCALE: HOW SEVERE IS YOUR ITCHING?: 0

## 2024-07-25 ASSESSMENT — DERMATOLOGY QUALITY OF LIFE (QOL) ASSESSMENT
RATE HOW BOTHERED YOU ARE BY EFFECTS OF YOUR SKIN PROBLEMS ON YOUR ACTIVITIES (EG, GOING OUT, ACCOMPLISHING WHAT YOU WANT, WORK ACTIVITIES OR YOUR RELATIONSHIPS WITH OTHERS): 6 - ALWAYS BOTHERED
RATE HOW EMOTIONALLY BOTHERED YOU ARE BY YOUR SKIN PROBLEM (FOR EXAMPLE, WORRY, EMBARRASSMENT, FRUSTRATION): 6 - ALWAYS BOTHERED
RATE HOW EMOTIONALLY BOTHERED YOU ARE BY YOUR SKIN PROBLEM (FOR EXAMPLE, WORRY, EMBARRASSMENT, FRUSTRATION): 6 - ALWAYS BOTHERED
RATE HOW BOTHERED YOU ARE BY SYMPTOMS OF YOUR SKIN PROBLEM (EG, ITCHING, STINGING BURNING, HURTING OR SKIN IRRITATION): 0 - NEVER BOTHERED
RATE HOW BOTHERED YOU ARE BY SYMPTOMS OF YOUR SKIN PROBLEM (EG, ITCHING, STINGING BURNING, HURTING OR SKIN IRRITATION): 0 - NEVER BOTHERED
RATE HOW BOTHERED YOU ARE BY EFFECTS OF YOUR SKIN PROBLEMS ON YOUR ACTIVITIES (EG, GOING OUT, ACCOMPLISHING WHAT YOU WANT, WORK ACTIVITIES OR YOUR RELATIONSHIPS WITH OTHERS): 6 - ALWAYS BOTHERED

## 2024-07-25 NOTE — PROGRESS NOTES
RN talked to He[facility] patient's sister. It sounds like the patient needs a care giver and [facility] has taken over. RN asked about menstrual cycles and bleeding but [facility] was not sure. Patient has a GI visit scheduled next week. Patient's Hgb has improved since receiving the IV iron inpatient. [facility] has my number to call and they are on the waiting list.

## 2024-07-25 NOTE — PROGRESS NOTES
"Subjective     Belinda Betancourt is a 28 y.o. female who presents for the following: Rash.  The patient and her sister report she has been breaking out with \"clusters\" of red bumps on her back and arms for many years.  The patient states they do not itch or hurt, but she continues to develop new bumps, including as recently as last night.  She also notes red, dry, itchy areas on her hands.      Review of Systems:  No other skin or systemic complaints other than what is documented elsewhere in the note.    The following portions of the chart were reviewed this encounter and updated as appropriate:       Skin Cancer History  No skin cancer on file.    Specialty Problems    None      Past Dermatologic / Past Relevant Medical History:    - history of cheilitis  - no history of eczema or psoriasis    Family History:    No family history of eczema or psoriasis    Social History:    The patient states she graduated from [employer] in 2012 and is accompanied by her older sister, Heide, today    Allergies:  Clindamycin    Current Medications / CAM's:    Current Outpatient Medications:     ferrous sulfate, 325 mg ferrous sulfate, tablet, Take 1 tablet by mouth once a day on Monday, Wednesday, and Friday., Disp: 90 tablet, Rfl: 1    LACTOBACILLUS ACIDOPHILUS ORAL, Take 1 tablet by mouth once daily in the morning. 1 gummy every morning, Disp: , Rfl:     nutritional drink (Ensure High Protein) liquid, Drink 8mL three times daily, Disp: 990 mL, Rfl: 11    pantoprazole (ProtoNix) 40 mg EC tablet, Take 1 tablet (40 mg) by mouth once daily. Do not crush, chew, or split., Disp: 30 tablet, Rfl: 1     Objective   Well appearing patient in no apparent distress; mood and affect are within normal limits.    A waist-up examination was performed including scalp, face, eyes, ears, nose, lips, neck, chest, axillae, abdomen, back, and bilateral upper extremities. All findings within normal limits unless otherwise noted " below.        Assessment/Plan   1. Rash and other nonspecific skin eruption  Right Upper Back  Scattered on her back and bilateral upper extremities, there are several similar-appearing pink to erythematous, urticarial appearing papules, some of which appear to be grouped or arranged in clusters          Pink erythematous, grouped papules - back and bilateral upper extremities.  The clinical differential diagnosis for these lesions includes possibly arthropod bites versus folliculitis versus lymphomatoid papulosis versus urticaria.  The nature of each of these conditions and management options, including possible biopsy for further diagnostic evaluation, were discussed extensively with the patient and her sister today.  After discussing the risks and benefits of various management options, the patient and her sister expressed understanding, and she wishes to undergo biopsy today.  Thus, at this time, I recommend punch biopsy of a representative lesion on the patient's right upper back in the office today for further diagnostic evaluation.  We agreed we would defer treatment for this condition until biopsy results are available.  The patient expressed understanding, is in agreement with this plan, and wishes to proceed with the biopsy today.    Lesion biopsy - Right Upper Back  Type of biopsy: punch    Informed consent: discussed and consent obtained    Timeout: patient name, date of birth, surgical site, and procedure verified    Procedure prep:  Patient was prepped and draped  Anesthesia: the lesion was anesthetized in a standard fashion    Anesthetic:  1% lidocaine w/ epinephrine 1-100,000 local infiltration  Punch size:  3 mm  Suture size:  5-0  Suture type: Prolene (polypropylene)    Suture removal (days):  14  Hemostasis achieved with: suture    Outcome: patient tolerated procedure well    Post-procedure details: sterile dressing applied and wound care instructions given    Dressing type: bandage and petrolatum       Specimen 1 - Dermatopathology- DERM LAB  Differential Diagnosis: arthropod bite v folliculitis v LyP  Check Margins Yes/No?:    Comments:    Dermpath Lab: Routine Histopathology (formalin-fixed tissue)    2. Hand eczema  Left Hand - Posterior  On her bilateral hands, there are similar-appearing ill-defined erythematous, scaly, slightly lichenified, thin plaques    Hand Dermatitis -bilateral hands.  The potentially chronic and intermittently flaring nature of this condition and treatment options were discussed extensively with the patient and her sister today.  At this time, I recommend topical steroid therapy with Triamcinolone 0.1% cream, which the patient was instructed to apply twice daily to the affected areas of her hands (avoid face, groin, body folds) for the next 2 weeks, followed by taper to twice daily on weekends only for persistent areas; the patient may repeat treatment in a 2-week burst-and-taper fashion every 6-8 weeks as needed for future flares.  I also emphasized the importance of dry, sensitive skin care and good hand hygiene, including minimizing the frequency and duration of hand-washing as much as is safely possible; using a lukewarm, but not hot water and a mild soap, such as Dove; and frequent and aggressive moisturization, at least twice daily and immediately following hand-washing, with recommended over-the-counter moisturizing creams, such as Eucerin, Cetaphil, Cerave, or Aveeno; Vaseline or Aquaphor ointments; or Neutrogena Norwegian Formula Hand Cream.  The risks, benefits, and side effects of this medication, including possible skin atrophy with overuse of topical steroids, were discussed.  The patient and her sister expressed understanding and are in agreement with this plan.

## 2024-07-26 LAB
HEMOGLOBIN A2: 2.3 % (ref 2–3.5)
HEMOGLOBIN A: 97.2 % (ref 95.8–98)
HEMOGLOBIN F: 0.5 % (ref 0–2)
HEMOGLOBIN IDENTIFICATION INTERPRETATION: ABNORMAL
PATH REVIEW-HGB IDENTIFICATION: ABNORMAL

## 2024-07-27 LAB
B BURGDOR DNA SPEC QL NAA+PROBE: NOT DETECTED
SPECIMEN SOURCE: NORMAL

## 2024-07-30 ENCOUNTER — PATIENT OUTREACH (OUTPATIENT)
Dept: PRIMARY CARE | Facility: CLINIC | Age: 29
End: 2024-07-30
Payer: MEDICAID

## 2024-07-30 LAB
LAB AP ASR DISCLAIMER: NORMAL
LABORATORY COMMENT REPORT: NORMAL
PATH REPORT.FINAL DX SPEC: NORMAL
PATH REPORT.GROSS SPEC: NORMAL
PATH REPORT.MICROSCOPIC SPEC OTHER STN: NORMAL
PATH REPORT.RELEVANT HX SPEC: NORMAL
PATH REPORT.TOTAL CANCER: NORMAL

## 2024-07-30 NOTE — PROGRESS NOTES
Unable to reach patient for call back after patient's follow up appointment with PCP.   HUANM with call back number for patient to call if needed   If no voicemail available call attempts x 2 were made to contact the patient to assist with any questions or concerns patient may have.

## 2024-07-31 ENCOUNTER — OFFICE VISIT (OUTPATIENT)
Dept: GASTROENTEROLOGY | Facility: CLINIC | Age: 29
End: 2024-07-31
Payer: MEDICAID

## 2024-07-31 VITALS
HEART RATE: 78 BPM | BODY MASS INDEX: 25.94 KG/M2 | HEIGHT: 61 IN | DIASTOLIC BLOOD PRESSURE: 74 MMHG | SYSTOLIC BLOOD PRESSURE: 115 MMHG | WEIGHT: 137.4 LBS | TEMPERATURE: 98.6 F

## 2024-07-31 DIAGNOSIS — D50.9 IRON DEFICIENCY ANEMIA, UNSPECIFIED IRON DEFICIENCY ANEMIA TYPE: Primary | ICD-10-CM

## 2024-07-31 DIAGNOSIS — D64.9 ANEMIA, UNSPECIFIED TYPE: ICD-10-CM

## 2024-07-31 DIAGNOSIS — R68.81 EARLY SATIETY: ICD-10-CM

## 2024-07-31 PROCEDURE — 3008F BODY MASS INDEX DOCD: CPT | Performed by: NURSE PRACTITIONER

## 2024-07-31 PROCEDURE — 99244 OFF/OP CNSLTJ NEW/EST MOD 40: CPT | Performed by: NURSE PRACTITIONER

## 2024-07-31 RX ORDER — LANOLIN ALCOHOL/MO/W.PET/CERES
500 CREAM (GRAM) TOPICAL DAILY
COMMUNITY

## 2024-07-31 RX ORDER — POLYETHYLENE GLYCOL 3350 17 G/17G
238 POWDER, FOR SOLUTION ORAL ONCE
Qty: 238 G | Refills: 0 | Status: SHIPPED | OUTPATIENT
Start: 2024-07-31 | End: 2024-07-31

## 2024-07-31 ASSESSMENT — PAIN SCALES - GENERAL: PAINLEVEL: 0-NO PAIN

## 2024-07-31 NOTE — PATIENT INSTRUCTIONS
Thanks for coming to the GI clinic.     I would like you to get an EGD.     I would like you to get a colonoscopy.   Please read all of the instructions 7 days before your colonoscopy.   You will need to take ONLY clear liquids the ENTIRE day before your procedure. These include (clear fruit juices, soda, Gatorade, broth, jello and coffee/tea) Avoid red and purple drinks. No cream or milk in the coffee.   You will need to t-onur a bowel preparation.   You will also need a .      Please call [phone] to schedule the above procedures.     Follow up will be based upon the above.

## 2024-07-31 NOTE — PROGRESS NOTES
Subjective   Patient ID: Belinda Betancourt is a 28 y.o. female who presents for anemia.     This is a 28 year old WF with history of scoliosis who is presenting to the GI clinic for an initial visit.     History per pt, sister, and review of EMR     Pt is accompanied to this visit by her sister who she lives with.     On 24 she was admitted to [facility] through the ED for symptomatic RAGHAVENDRA noted on labs drawn by PCP. Hgb was 6.7 prior to admission. She was transfused blood and iron.  She was referred to GI, gynecology, and hematology. She was discharged on pantoprazole and ferrous sulfate which she continues to take.     ROS positive for mild early satiety (unsure when it started)     She is not a vegetarian.     Denies heavy menstrual cycles.     Denies gross hematuria.     She has an appointment with hematology on 24.     She has a gynecology appointment on 24.     Denies dysphagia, odynophagia, heartburn, regurgitation, N/V, diarrhea, constipation, hematemesis, hematochezia, and melena.     B12, folate, LDH, and haptoglobin normal (2024)     Celiac serologies negative (2024)     She is taking a daily gummy laxative (unsure what type) to prevent constipation from the iron supplement.     Denies use of NSAIDs.     Past medical history:  See above    Past surgical history:   Right eye surgery to correct amblyopia in elementary school     Family history:   Maternal grandfather- colon cancer   Paternal uncle- PUD  Father ()- PUD   Sister- acid reflux     Social history:  Denies use of alcohol, tobacco, and illicit drugs       Review of Systems   Constitutional:  Negative for chills, diaphoresis, fatigue and fever.   HENT:  Negative for congestion, ear pain, hearing loss, sneezing and sore throat.    Eyes:  Negative for photophobia, pain and visual disturbance.   Respiratory:  Negative for cough, shortness of breath and wheezing.    Cardiovascular:  Negative for chest pain, palpitations and leg  "swelling.   Endocrine: Negative for cold intolerance and heat intolerance.   Genitourinary:  Negative for dysuria, flank pain, frequency and hematuria.   Musculoskeletal:  Negative for arthralgias, back pain, gait problem, joint swelling and myalgias.   Skin:  Negative for rash.   Neurological:  Negative for dizziness, syncope, weakness, light-headedness, numbness and headaches.   Hematological:  Negative for adenopathy.   Psychiatric/Behavioral:  Negative for agitation and hallucinations. The patient is not nervous/anxious.        Allergies   Allergen Reactions    Clindamycin Hives     Current Outpatient Medications   Medication Sig Dispense Refill    ascorbic acid (Vitamin C) 500 mg ER capsule Take 1 capsule (500 mg) by mouth once daily.      ferrous sulfate, 325 mg ferrous sulfate, tablet Take 1 tablet by mouth once a day on Monday, Wednesday, and Friday. 90 tablet 1    LACTOBACILLUS ACIDOPHILUS ORAL Take 1 tablet by mouth once daily in the morning. 1 gummy every morning      nutritional drink (Ensure High Protein) liquid Drink 8mL three times daily 990 mL 11    pantoprazole (ProtoNix) 40 mg EC tablet Take 1 tablet (40 mg) by mouth once daily. Do not crush, chew, or split. 30 tablet 1    UNABLE TO FIND OTC gummy laxative daily to prevent constipation      polyethylene glycol (Glycolax, Miralax) 17 gram/dose powder Take 238 g by mouth 1 time for 1 dose. Use as directed by  endoscopy department for colonoscopy 238 g 0     No current facility-administered medications for this visit.        Objective     /74   Pulse 78   Temp 37 °C (98.6 °F) (Temporal)   Ht 1.549 m (5' 1\")   Wt 62.3 kg (137 lb 6.4 oz)   BMI 25.96 kg/m²     Physical Exam  Constitutional:       General: She is not in acute distress.  HENT:      Head: Normocephalic and atraumatic.   Eyes:      Conjunctiva/sclera: Conjunctivae normal.   Cardiovascular:      Rate and Rhythm: Normal rate and regular rhythm.      Heart sounds: No murmur " heard.     No gallop.   Pulmonary:      Effort: Pulmonary effort is normal.      Breath sounds: Normal breath sounds.   Abdominal:      General: Bowel sounds are normal. There is no distension.      Tenderness: There is no abdominal tenderness. There is no guarding.   Musculoskeletal:         General: No swelling or deformity. Normal range of motion.      Cervical back: Normal range of motion. No rigidity.   Skin:     General: Skin is warm and dry.      Coloration: Skin is not jaundiced.      Findings: No lesion or rash.   Neurological:      General: No focal deficit present.      Mental Status: She is alert and oriented to person, place, and time.   Psychiatric:         Mood and Affect: Mood normal.         Assessment/Plan   Problem List Items Addressed This Visit       Anemia - Primary    Relevant Medications    polyethylene glycol (Glycolax, Miralax) 17 gram/dose powder    Other Relevant Orders    Colonoscopy Diagnostic    Esophagogastroduodenoscopy (EGD)     Other Visit Diagnoses       Early satiety               1. RAGHAVENDRA: history somewhat vague, but other than some mild early satiety, she's asymptomatic from a GI perspective. Consider erosive esophagitis, PUD, gastritis, upper/lower GI malignancy and vascular ectasia.   - will proceed with EGD  - will proceed with colonoscopy   - Miralax Gatorade split bowel prep for colonoscopy  - consider capsule endoscopy pending the above     2. Follow up:  - pt plans to follow up based upon the above

## 2024-08-01 ASSESSMENT — ENCOUNTER SYMPTOMS
HEMATURIA: 0
ADENOPATHY: 0
FLANK PAIN: 0
JOINT SWELLING: 0
DIZZINESS: 0
BACK PAIN: 0
NERVOUS/ANXIOUS: 0
SORE THROAT: 0
COUGH: 0
ARTHRALGIAS: 0
PHOTOPHOBIA: 0
WHEEZING: 0
FEVER: 0
CHILLS: 0
FATIGUE: 0
HEADACHES: 0
DYSURIA: 0
AGITATION: 0
NUMBNESS: 0
SHORTNESS OF BREATH: 0
MYALGIAS: 0
EYE PAIN: 0
LIGHT-HEADEDNESS: 0
DIAPHORESIS: 0
HALLUCINATIONS: 0
WEAKNESS: 0
FREQUENCY: 0
PALPITATIONS: 0

## 2024-08-04 ASSESSMENT — DERMATOLOGY QUALITY OF LIFE (QOL) ASSESSMENT
WHAT SINGLE SKIN CONDITION LISTED BELOW IS THE PATIENT ANSWERING THE QUALITY-OF-LIFE ASSESSMENT QUESTIONS ABOUT: NONE OF THE ABOVE
RATE HOW BOTHERED YOU ARE BY EFFECTS OF YOUR SKIN PROBLEMS ON YOUR ACTIVITIES (EG, GOING OUT, ACCOMPLISHING WHAT YOU WANT, WORK ACTIVITIES OR YOUR RELATIONSHIPS WITH OTHERS): 6 - ALWAYS BOTHERED
RATE HOW BOTHERED YOU ARE BY EFFECTS OF YOUR SKIN PROBLEMS ON YOUR ACTIVITIES (EG, GOING OUT, ACCOMPLISHING WHAT YOU WANT, WORK ACTIVITIES OR YOUR RELATIONSHIPS WITH OTHERS): 6 - ALWAYS BOTHERED
RATE HOW BOTHERED YOU ARE BY SYMPTOMS OF YOUR SKIN PROBLEM (EG, ITCHING, STINGING BURNING, HURTING OR SKIN IRRITATION): 6 - ALWAYS BOTHERED
RATE HOW EMOTIONALLY BOTHERED YOU ARE BY YOUR SKIN PROBLEM (FOR EXAMPLE, WORRY, EMBARRASSMENT, FRUSTRATION): 6 - ALWAYS BOTHERED
WHAT SINGLE SKIN CONDITION LISTED BELOW IS THE PATIENT ANSWERING THE QUALITY-OF-LIFE ASSESSMENT QUESTIONS ABOUT: NONE OF THE ABOVE
RATE HOW BOTHERED YOU ARE BY SYMPTOMS OF YOUR SKIN PROBLEM (EG, ITCHING, STINGING BURNING, HURTING OR SKIN IRRITATION): 6 - ALWAYS BOTHERED
RATE HOW EMOTIONALLY BOTHERED YOU ARE BY YOUR SKIN PROBLEM (FOR EXAMPLE, WORRY, EMBARRASSMENT, FRUSTRATION): 6 - ALWAYS BOTHERED

## 2024-08-07 ENCOUNTER — OFFICE VISIT (OUTPATIENT)
Dept: HEMATOLOGY/ONCOLOGY | Facility: CLINIC | Age: 29
End: 2024-08-07
Payer: MEDICAID

## 2024-08-07 ENCOUNTER — APPOINTMENT (OUTPATIENT)
Dept: DERMATOLOGY | Facility: CLINIC | Age: 29
End: 2024-08-07
Payer: MEDICAID

## 2024-08-07 ENCOUNTER — APPOINTMENT (OUTPATIENT)
Dept: NUTRITION | Facility: CLINIC | Age: 29
End: 2024-08-07
Payer: MEDICAID

## 2024-08-07 VITALS
RESPIRATION RATE: 16 BRPM | BODY MASS INDEX: 25.49 KG/M2 | TEMPERATURE: 96.8 F | SYSTOLIC BLOOD PRESSURE: 114 MMHG | DIASTOLIC BLOOD PRESSURE: 76 MMHG | HEART RATE: 53 BPM | WEIGHT: 134.92 LBS | OXYGEN SATURATION: 93 %

## 2024-08-07 DIAGNOSIS — D50.0 IRON DEFICIENCY ANEMIA DUE TO CHRONIC BLOOD LOSS: Primary | ICD-10-CM

## 2024-08-07 DIAGNOSIS — D64.9 ANEMIA, UNSPECIFIED TYPE: ICD-10-CM
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
## 2024-08-07 DIAGNOSIS — K90.9 IDIOPATHIC STEATORRHEA (HHS-HCC): ICD-10-CM

## 2024-08-07 LAB
BASOPHILS # BLD AUTO: 0.02 X10*3/UL (ref 0–0.1)
BASOPHILS NFR BLD AUTO: 0.3 %
DACRYOCYTES BLD QL SMEAR: NORMAL
EOSINOPHIL # BLD AUTO: 0.14 X10*3/UL (ref 0–0.7)
EOSINOPHIL NFR BLD AUTO: 2 %
ERYTHROCYTE [DISTWIDTH] IN BLOOD BY AUTOMATED COUNT: ABNORMAL %
FERRITIN SERPL-MCNC: 49 NG/ML (ref 8–150)
FOLATE SERPL-MCNC: 14 NG/ML
HBV CORE AB SER QL: NONREACTIVE
HBV SURFACE AB SER-ACNC: <3.1 MIU/ML
HBV SURFACE AG SERPL QL IA: NONREACTIVE
HCT VFR BLD AUTO: 39.3 % (ref 36–46)
HGB BLD-MCNC: 11.6 G/DL (ref 12–16)
HYPOCHROMIA BLD QL SMEAR: NORMAL
IGA SERPL-MCNC: 215 MG/DL (ref 70–400)
IGG SERPL-MCNC: 1320 MG/DL (ref 700–1600)
IGM SERPL-MCNC: 89 MG/DL (ref 40–230)
IMM GRANULOCYTES # BLD AUTO: 0.01 X10*3/UL (ref 0–0.7)
IMM GRANULOCYTES NFR BLD AUTO: 0.1 % (ref 0–0.9)
IRON SATN MFR SERPL: 13 % (ref 25–45)
IRON SERPL-MCNC: 53 UG/DL (ref 35–150)
LDH SERPL L TO P-CCNC: 149 U/L (ref 84–246)
LYMPHOCYTES # BLD AUTO: 1.6 X10*3/UL (ref 1.2–4.8)
LYMPHOCYTES NFR BLD AUTO: 22.7 %
MCH RBC QN AUTO: 22.6 PG (ref 26–34)
MCHC RBC AUTO-ENTMCNC: 29.5 G/DL (ref 32–36)
MCV RBC AUTO: 77 FL (ref 80–100)
MONOCYTES # BLD AUTO: 0.71 X10*3/UL (ref 0.1–1)
MONOCYTES NFR BLD AUTO: 10.1 %
NEUTROPHILS # BLD AUTO: 4.56 X10*3/UL (ref 1.2–7.7)
NEUTROPHILS NFR BLD AUTO: 64.8 %
NRBC BLD-RTO: ABNORMAL /100{WBCS}
OVALOCYTES BLD QL SMEAR: NORMAL
PLATELET # BLD AUTO: 278 X10*3/UL (ref 150–450)
PROT SERPL-MCNC: 7.7 G/DL (ref 6.4–8.2)
RBC # BLD AUTO: 5.14 X10*6/UL (ref 4–5.2)
RBC MORPH BLD: NORMAL
SCHISTOCYTES BLD QL SMEAR: NORMAL
SPHEROCYTES BLD QL SMEAR: NORMAL
TARGETS BLD QL SMEAR: NORMAL
TIBC SERPL-MCNC: 393 UG/DL (ref 240–445)
UIBC SERPL-MCNC: 340 UG/DL (ref 110–370)
VIT B12 SERPL-MCNC: 460 PG/ML (ref 211–911)
WBC # BLD AUTO: 7 X10*3/UL (ref 4.4–11.3)

## 2024-08-07 PROCEDURE — 85025 COMPLETE CBC W/AUTO DIFF WBC: CPT | Performed by: PHYSICIAN ASSISTANT

## 2024-08-07 PROCEDURE — 82784 ASSAY IGA/IGD/IGG/IGM EACH: CPT | Mod: GEALAB | Performed by: PHYSICIAN ASSISTANT

## 2024-08-07 PROCEDURE — 36415 COLL VENOUS BLD VENIPUNCTURE: CPT | Performed by: PHYSICIAN ASSISTANT

## 2024-08-07 PROCEDURE — 83521 IG LIGHT CHAINS FREE EACH: CPT | Mod: GEALAB | Performed by: PHYSICIAN ASSISTANT

## 2024-08-07 PROCEDURE — 86334 IMMUNOFIX E-PHORESIS SERUM: CPT | Mod: GEALAB | Performed by: PHYSICIAN ASSISTANT

## 2024-08-07 PROCEDURE — 82607 VITAMIN B-12: CPT | Mod: GEALAB | Performed by: PHYSICIAN ASSISTANT

## 2024-08-07 PROCEDURE — 84155 ASSAY OF PROTEIN SERUM: CPT | Mod: GEALAB | Performed by: PHYSICIAN ASSISTANT

## 2024-08-07 PROCEDURE — 99205 OFFICE O/P NEW HI 60 MIN: CPT | Performed by: PHYSICIAN ASSISTANT

## 2024-08-07 PROCEDURE — 83540 ASSAY OF IRON: CPT | Performed by: PHYSICIAN ASSISTANT

## 2024-08-07 PROCEDURE — 82746 ASSAY OF FOLIC ACID SERUM: CPT | Mod: GEALAB | Performed by: PHYSICIAN ASSISTANT

## 2024-08-07 PROCEDURE — 82728 ASSAY OF FERRITIN: CPT | Performed by: PHYSICIAN ASSISTANT

## 2024-08-07 PROCEDURE — 83615 LACTATE (LD) (LDH) ENZYME: CPT | Performed by: PHYSICIAN ASSISTANT

## 2024-08-07 PROCEDURE — 86706 HEP B SURFACE ANTIBODY: CPT | Mod: GEALAB | Performed by: PHYSICIAN ASSISTANT

## 2024-08-07 PROCEDURE — 87340 HEPATITIS B SURFACE AG IA: CPT | Mod: GEALAB | Performed by: PHYSICIAN ASSISTANT

## 2024-08-07 PROCEDURE — 86704 HEP B CORE ANTIBODY TOTAL: CPT | Mod: GEALAB | Performed by: PHYSICIAN ASSISTANT

## 2024-08-07 PROCEDURE — 99215 OFFICE O/P EST HI 40 MIN: CPT | Performed by: PHYSICIAN ASSISTANT

## 2024-08-07 ASSESSMENT — PAIN SCALES - GENERAL: PAINLEVEL: 0-NO PAIN

## 2024-08-07 NOTE — PROGRESS NOTES
"Subjective     Belinda Betancourt is a 28 y.o. female who presents for the following: Follow-up.  The patient and her sister report she has been breaking out with \"clusters\" of red bumps on her back and arms for at least 1 year.  The patient states they do not itch or hurt, but she continues to develop new bumps, including as recently as last night.    Punch biopsy of a representative lesion on her right upper back performed at her last visit in our office on 7/25/24 revealed \"superficial polymorphous infiltrate,\" the findings of which were consistent with an arthropod assault, which was the favored clinical diagnosis, and the expanded histologic differential diagnosis included a drug eruption.    Today, the patient and her sister state the biopsy site healed well.  She reports she continues to develop intermittent groups of red, itchy bumps on her skin, especially her arms and legs.  She also states she is not currently treating the areas with any topical medications.  Of note, she states she currently takes pantoprazole, a probiotic, vitamin C, and an iron supplement, but the patient and her sister state she was not taking any medications at the time this problem began.      Review of Systems:  No other skin or systemic complaints other than what is documented elsewhere in the note.    The following portions of the chart were reviewed this encounter and updated as appropriate:       Skin Cancer History  No skin cancer on file.    Specialty Problems    None      Past Dermatologic / Past Relevant Medical History:    - history of hand dermatitis  - cheilitis  - no history of psoriasis    Family History:    No family history of eczema or psoriasis    Social History:    The patient states she graduated from "[employer] school in 2012 and is accompanied by her older sister, Heide, again today    Allergies:  Clindamycin    Current Medications / CAM's:    Current Outpatient Medications:     ascorbic acid (Vitamin C) 500 " mg ER capsule, Take 1 capsule (500 mg) by mouth once daily., Disp: , Rfl:     ferrous sulfate, 325 mg ferrous sulfate, tablet, Take 1 tablet by mouth once a day on Monday, Wednesday, and Friday., Disp: 90 tablet, Rfl: 1    LACTOBACILLUS ACIDOPHILUS ORAL, Take 1 tablet by mouth once daily in the morning. 1 gummy every morning, Disp: , Rfl:     nutritional drink (Ensure High Protein) liquid, Drink 8mL three times daily, Disp: 990 mL, Rfl: 11    pantoprazole (ProtoNix) 40 mg EC tablet, Take 1 tablet (40 mg) by mouth once daily. Do not crush, chew, or split., Disp: 30 tablet, Rfl: 1    UNABLE TO FIND, OTC gummy laxative daily to prevent constipation, Disp: , Rfl:      Objective   Well appearing patient in no apparent distress; mood and affect are within normal limits.    A skin examination was performed including: Face, neck, and extremities. All findings within normal limits unless otherwise noted below.    Assessment/Plan   1. Bitten or stung by nonvenomous insect and other nonvenomous arthropods, initial encounter  Right Upper Arm - Anterior  Scattered on her bilateral upper and lower extremities, there are several similar-appearing pink to erythematous, urticarial appearing papules, some of which appear to be grouped or arranged in clusters     Pink to erythematous, grouped papules - back and extremities.  Based on the patient's history, recent exam and repeat exam today, and the histologic findings in her recent biopsy, the favored clinico-pathologic diagnosis for these lesions is most likely arthropod assault versus less likely papular urticaria or dermal hypersensitivity reaction versus less likely drug eruption.  The nature of each of these conditions and management options were discussed extensively with the patient and her sister in the office today.    At this time, I recommend topical steroid therapy with Triamcinolone 0.1% cream, which the patient was instructed to apply twice daily to the affected areas of  her body (avoid face, groin, body folds) for the next 1-2 weeks, followed by taper to twice daily on weekends only for persistent areas; the patient may repeat treatment in a 2-week burst-and-taper fashion every 6-8 weeks as needed for future flares.  In addition, for symptomatic relief, I recommend oral anti-histamine therapy with over-the-counter Allegra 180 mg (or Zytrec 10 mg or Claritin 10 mg) PO once daily in the morning and/or over-the-counter Benadryl PO qhs PRN insomnia/pruritus as tolerated without excessive drowsiness or sedation.  The risks, benefits, and side effects of these medications, including possible skin atrophy with overuse of topical steroids and possible drowsiness or sedation with oral anti-histamines, were discussed.    Lastly, I recommend they hire an  to come out to their home to investigate for possible arthropod infestations, such as bedbugs, spiders, ants, or other arthropods.  The patient and her sister expressed understanding, are in agreement with this plan, and state they will hire an  to come out to their home as recommended.  Of note, her suture was removed in the office today as well.

## 2024-08-08 LAB
KAPPA LC SERPL-MCNC: 1.92 MG/DL (ref 0.33–1.94)
KAPPA LC/LAMBDA SER: 1.76 {RATIO} (ref 0.26–1.65)
LAMBDA LC SERPL-MCNC: 1.09 MG/DL (ref 0.57–2.63)

## 2024-08-09 ENCOUNTER — APPOINTMENT (OUTPATIENT)
Dept: GASTROENTEROLOGY | Facility: EXTERNAL LOCATION | Age: 29
End: 2024-08-09
Payer: MEDICAID

## 2024-08-12 LAB
ALBUMIN: 4.4 G/DL (ref 3.4–5)
ALPHA 1 GLOBULIN: 0.3 G/DL (ref 0.2–0.6)
ALPHA 2 GLOBULIN: 0.8 G/DL (ref 0.4–1.1)
BETA GLOBULIN: 0.9 G/DL (ref 0.5–1.2)
GAMMA GLOBULIN: 1.2 G/DL (ref 0.5–1.4)
IMMUNOFIXATION COMMENT: NORMAL
PATH REVIEW - SERUM IMMUNOFIXATION: NORMAL
PATH REVIEW-SERUM PROTEIN ELECTROPHORESIS: NORMAL
PROTEIN ELECTROPHORESIS COMMENT: NORMAL

## 2024-08-12 NOTE — PROGRESS NOTES
Reason for Visit  Belinda Betancourt is a 28 y.o. female referred by Dr. Barrios for RAGHAVENDRA.    PMH/PSH: Scoliosis s/p back brace, Lazy eye, chronic constipation, MRSA Infection (abdomen).  FH: MGF-colon cancer, paternal uncle-lymphoma, lung cancer.  Soc Hx: Denies smoking, ETOH, illicit drugs; Boyfriend, .    History of Present Illness:  Patient accompanied by sister and cousin, most of the history from them.    Patient had been living with her parents but only recently moved in with her sister and has taken over her care. Recently established with PCP and blood work showed hgb of 6.7. Patient admitted in 7/2024 and received 1 unit of blood and given 2 doses of IV Venofer. Discharged home on Protonix 40mg daily, Ferrous sulfate 325mg M/W/F.     On assessment, patient has no specific complaints. Notes normal menses. Denies melena or BRBPR. Being followed for rash by derm. Tolerating oral iron. Family reports some sort of cognitive delay but has never been evaluated for it. Otherwise doing well. Denies B symptoms, frequent infections, n/v/d/abd pain, SOB/CP/MIGUEL, neuropathy or  any othetrcomplaints at this time.    Review of Systems: All of the systems have been reviewed and are negative for complaints except what is stated in the HPI and/or past medical history.    Allergies and Intolerances:  Allergies   Allergen Reactions    Clindamycin Hives     Outpatient Medication Profile:  Current Outpatient Medications   Medication Sig Dispense Refill    ascorbic acid (Vitamin C) 500 mg ER capsule Take 1 capsule (500 mg) by mouth once daily.      ferrous sulfate, 325 mg ferrous sulfate, tablet Take 1 tablet by mouth once a day on Monday, Wednesday, and Friday. 90 tablet 1    LACTOBACILLUS ACIDOPHILUS ORAL Take 1 tablet by mouth once daily in the morning. 1 gummy every morning      nutritional drink (Ensure High Protein) liquid Drink 8mL three times daily 990 mL 11    pantoprazole (ProtoNix) 40 mg EC tablet Take 1 tablet  "(40 mg) by mouth once daily. Do not crush, chew, or split. 30 tablet 1    UNABLE TO FIND OTC gummy laxative daily to prevent constipation       No current facility-administered medications for this visit.      Vitals and Measurements:       7/11/2024     2:52 PM 7/11/2024     3:58 PM 7/11/2024     8:14 PM 7/12/2024     5:30 AM 7/24/2024    11:18 AM 7/31/2024    10:10 AM 8/7/2024    10:49 AM   Vitals   Systolic 111 111 108 105 116 115 114   Diastolic 68 68 65 62 78 74 76   Heart Rate 65 68 73 65 87 78 53   Temp 36.8 °C (98.2 °F) 37.2 °C (99 °F) 36.8 °C (98.2 °F) 36.7 °C (98.1 °F) 37 °C (98.6 °F) 37 °C (98.6 °F) 36 °C (96.8 °F)   Resp 16  20 18   16   Height (in)     1.549 m (5' 1\") 1.549 m (5' 1\")    Weight (lb)     134 137.4 134.92   BMI     25.32 kg/m2 25.96 kg/m2 25.49 kg/m2   BSA (m2)     1.62 m2 1.64 m2 1.62 m2   Visit Report     Report Report Report    Report     Physical Exam:   Constitutional: alert, awake and oriented, not in acute distress   HEENT: moist mucous membranes, normal nose   Neck: supple, no lymphadenopathy   EYES: PERRL, EOM intact, conjunctiva normal  CV: normal rate, regular rhythm, no murmur   Pulmonary: normal effort, no wheezing, equal air entry   Abdominal: soft, non tender, non distended, no palpable hepatosplenomegaly   : no CVA tenderness  Skin: no jaundice, rash or erythema  Neurological: AAOx3, no gross focal deficit   Psychiatric: normal mood and behavior   Lymph: no supraclavicular, axillary or inguinal LAD    Labs:  Lab Results   Component Value Date    WBC 7.0 08/07/2024    NEUTROABS 4.56 08/07/2024    IGABSOL 0.01 08/07/2024    LYMPHSABS 1.60 08/07/2024    MONOSABS 0.71 08/07/2024    EOSABS 0.14 08/07/2024    BASOSABS 0.02 08/07/2024    RBC 5.14 08/07/2024    MCV 77 (L) 08/07/2024    MCHC 29.5 (L) 08/07/2024    HGB 11.6 (L) 08/07/2024    HCT 39.3 08/07/2024     08/07/2024     Lab Results   Component Value Date    RETICCTPCT 0.5 07/11/2024      Lab Results   Component " Value Date    CREATININE 0.43 (L) 07/12/2024    BUN 8 07/12/2024    EGFR >90 07/12/2024     07/12/2024    K 4.0 07/12/2024     (H) 07/12/2024    CO2 18 (L) 07/12/2024      Lab Results   Component Value Date    ALT 7 07/12/2024    AST 14 07/12/2024    ALKPHOS 38 07/12/2024    BILITOT 0.3 07/12/2024      Lab Results   Component Value Date    TSH 0.70 07/10/2024     Lab Results   Component Value Date    TSH 0.70 07/10/2024     Lab Results   Component Value Date    IRON 53 08/07/2024    TIBC 393 08/07/2024    FERRITIN 49 08/07/2024      Lab Results   Component Value Date    NUXAPOPJ41 460 08/07/2024      Lab Results   Component Value Date    FOLATE 14.0 08/07/2024     Lab Results   Component Value Date    COREY Negative 07/10/2024     Lab Results   Component Value Date    HAPTOGLOBIN 84 07/11/2024     Lab Results   Component Value Date    IGG 1,320 08/07/2024    IGM 89 08/07/2024     08/07/2024     Assessment:    28 y.o. female referred for RAGHAVENDRA.      Plan:    Reviewed and discussed lab, imaging, and pathology results with patient in detail as well as diagnosis, prognosis, and treatment options.    Continue oral iron daily as tolerated; discussed IV iron    F/U w/GYN    Referral to GI for scopes given    Consider neurology referral    F/U w/PCP    RTC in 2 months    Patient verbalized understanding, and all her questions were answered to her satisfaction    60 min spent with patient greater than 50 % of which was spent in consultation and coordination of care.

## 2024-08-15 ENCOUNTER — PATIENT OUTREACH (OUTPATIENT)
Dept: PRIMARY CARE | Facility: CLINIC | Age: 29
End: 2024-08-15
Payer: MEDICAID

## 2024-08-15 NOTE — PROGRESS NOTES
Successful one month outreach to patient regarding hospitalization as patient continues TCM program.   At time of outreach call the patient feels as if their condition has returned to baseline since initial visit with PCP or specialist.  Questions or concerns addressed at this time with patient.   Provided contact information to patient if any further non-emergent needs arise.

## 2024-08-19 ENCOUNTER — APPOINTMENT (OUTPATIENT)
Dept: GASTROENTEROLOGY | Facility: EXTERNAL LOCATION | Age: 29
End: 2024-08-19
Payer: MEDICAID

## 2024-08-19 ENCOUNTER — LAB REQUISITION (OUTPATIENT)
Dept: LAB | Facility: HOSPITAL | Age: 29
End: 2024-08-19
Payer: MEDICAID

## 2024-08-19 DIAGNOSIS — K63.89 OTHER SPECIFIED DISEASES OF INTESTINE: ICD-10-CM

## 2024-08-19 DIAGNOSIS — D50.9 IRON DEFICIENCY ANEMIA, UNSPECIFIED IRON DEFICIENCY ANEMIA TYPE: ICD-10-CM

## 2024-08-19 DIAGNOSIS — K44.9 HIATAL HERNIA: Primary | ICD-10-CM

## 2024-08-19 PROCEDURE — 43239 EGD BIOPSY SINGLE/MULTIPLE: CPT | Performed by: INTERNAL MEDICINE

## 2024-08-19 PROCEDURE — 88305 TISSUE EXAM BY PATHOLOGIST: CPT

## 2024-08-19 PROCEDURE — 45380 COLONOSCOPY AND BIOPSY: CPT | Performed by: INTERNAL MEDICINE

## 2024-08-19 PROCEDURE — 87900 PHENOTYPE INFECT AGENT DRUG: CPT

## 2024-08-19 PROCEDURE — 88305 TISSUE EXAM BY PATHOLOGIST: CPT | Performed by: PATHOLOGY

## 2024-08-28 LAB
LAB AP ASR DISCLAIMER: NORMAL
LABORATORY COMMENT REPORT: NORMAL
PATH REPORT.FINAL DX SPEC: NORMAL
PATH REPORT.GROSS SPEC: NORMAL
PATH REPORT.RELEVANT HX SPEC: NORMAL
PATH REPORT.TOTAL CANCER: NORMAL

## 2024-09-04 ENCOUNTER — OFFICE VISIT (OUTPATIENT)
Dept: OBSTETRICS AND GYNECOLOGY | Facility: CLINIC | Age: 29
End: 2024-09-04
Payer: MEDICAID

## 2024-09-04 ENCOUNTER — LAB (OUTPATIENT)
Dept: LAB | Facility: LAB | Age: 29
End: 2024-09-04
Payer: MEDICAID

## 2024-09-04 VITALS
DIASTOLIC BLOOD PRESSURE: 74 MMHG | BODY MASS INDEX: 25.86 KG/M2 | SYSTOLIC BLOOD PRESSURE: 125 MMHG | WEIGHT: 137 LBS | HEIGHT: 61 IN

## 2024-09-04 DIAGNOSIS — Z11.3 SCREENING EXAMINATION FOR SEXUALLY TRANSMITTED DISEASE: ICD-10-CM

## 2024-09-04 DIAGNOSIS — A04.8 H. PYLORI INFECTION: Primary | ICD-10-CM

## 2024-09-04 DIAGNOSIS — Z12.4 SCREENING FOR CERVICAL CANCER: ICD-10-CM

## 2024-09-04 DIAGNOSIS — K90.9 IDIOPATHIC STEATORRHEA (HHS-HCC): ICD-10-CM

## 2024-09-04 DIAGNOSIS — D64.9 ANEMIA, UNSPECIFIED TYPE: ICD-10-CM

## 2024-09-04 DIAGNOSIS — Z01.419 WOMEN'S ANNUAL ROUTINE GYNECOLOGICAL EXAMINATION: Primary | ICD-10-CM

## 2024-09-04 DIAGNOSIS — D50.0 IRON DEFICIENCY ANEMIA DUE TO CHRONIC BLOOD LOSS: ICD-10-CM

## 2024-09-04 LAB
HBV SURFACE AG SERPL QL IA: NONREACTIVE
HCV AB SER QL: NONREACTIVE
HIV 1+2 AB+HIV1 P24 AG SERPL QL IA: NONREACTIVE
TREPONEMA PALLIDUM IGG+IGM AB [PRESENCE] IN SERUM OR PLASMA BY IMMUNOASSAY: NONREACTIVE

## 2024-09-04 PROCEDURE — 86780 TREPONEMA PALLIDUM: CPT

## 2024-09-04 PROCEDURE — 87389 HIV-1 AG W/HIV-1&-2 AB AG IA: CPT

## 2024-09-04 PROCEDURE — 87340 HEPATITIS B SURFACE AG IA: CPT

## 2024-09-04 PROCEDURE — 86803 HEPATITIS C AB TEST: CPT

## 2024-09-04 PROCEDURE — 36415 COLL VENOUS BLD VENIPUNCTURE: CPT

## 2024-09-04 PROCEDURE — 87491 CHLMYD TRACH DNA AMP PROBE: CPT | Performed by: ADVANCED PRACTICE MIDWIFE

## 2024-09-04 PROCEDURE — 1036F TOBACCO NON-USER: CPT | Performed by: ADVANCED PRACTICE MIDWIFE

## 2024-09-04 PROCEDURE — 3008F BODY MASS INDEX DOCD: CPT | Performed by: ADVANCED PRACTICE MIDWIFE

## 2024-09-04 PROCEDURE — 99385 PREV VISIT NEW AGE 18-39: CPT | Performed by: ADVANCED PRACTICE MIDWIFE

## 2024-09-04 PROCEDURE — 87205 SMEAR GRAM STAIN: CPT | Performed by: ADVANCED PRACTICE MIDWIFE

## 2024-09-04 PROCEDURE — 99395 PREV VISIT EST AGE 18-39: CPT | Performed by: ADVANCED PRACTICE MIDWIFE

## 2024-09-04 PROCEDURE — 86038 ANTINUCLEAR ANTIBODIES: CPT

## 2024-09-04 RX ORDER — DOXYCYCLINE 100 MG/1
100 CAPSULE ORAL 2 TIMES DAILY
Qty: 28 CAPSULE | Refills: 0 | Status: SHIPPED | OUTPATIENT
Start: 2024-09-04 | End: 2024-09-18

## 2024-09-04 RX ORDER — OMEPRAZOLE 40 MG/1
40 CAPSULE, DELAYED RELEASE ORAL
Qty: 28 CAPSULE | Refills: 0 | Status: SHIPPED | OUTPATIENT
Start: 2024-09-04 | End: 2024-09-18

## 2024-09-04 RX ORDER — BISMUTH SUBSALICYLATE 262 MG/1
524 TABLET ORAL
Qty: 112 TABLET | Refills: 0 | Status: SHIPPED | OUTPATIENT
Start: 2024-09-04 | End: 2024-09-18

## 2024-09-04 RX ORDER — METRONIDAZOLE 500 MG/1
500 TABLET ORAL 3 TIMES DAILY
Qty: 42 TABLET | Refills: 0 | Status: SHIPPED | OUTPATIENT
Start: 2024-09-04 | End: 2024-09-18

## 2024-09-04 ASSESSMENT — ENCOUNTER SYMPTOMS
CARDIOVASCULAR NEGATIVE: 0
CONSTITUTIONAL NEGATIVE: 0
NEUROLOGICAL NEGATIVE: 0
GASTROINTESTINAL NEGATIVE: 0
ENDOCRINE NEGATIVE: 0
HEMATOLOGIC/LYMPHATIC NEGATIVE: 0
RESPIRATORY NEGATIVE: 0
OCCASIONAL FEELINGS OF UNSTEADINESS: 0
DEPRESSION: 0
EYES NEGATIVE: 0
PSYCHIATRIC NEGATIVE: 0
MUSCULOSKELETAL NEGATIVE: 0
ALLERGIC/IMMUNOLOGIC NEGATIVE: 0
LOSS OF SENSATION IN FEET: 0

## 2024-09-04 ASSESSMENT — PAIN SCALES - GENERAL: PAINLEVEL: 0-NO PAIN

## 2024-09-04 NOTE — PROGRESS NOTES
"Assessment/Plan   Diagnoses and all orders for this visit:  Women's annual routine gynecological examination  Anemia, unspecified type  -     Referral to Gynecology  Screening for cervical cancer  -     THINPREP PAP TEST (>30)  Screening examination for sexually transmitted disease  -     C. trachomatis + N. gonorrhoeae, Amplified  -     Vaginitis Gram Stain For Bacterial Vaginosis + Yeast  -     Hepatitis BsAg; Future  -     Hepatitis C Antibody; Future  -     HIV 1/2 Antigen/Antibody Screen with Reflex to Confirmation; Future  -     Syphilis Screen with Reflex; Future      GURVINDER Collier-CNM     Subjective   Belinda Betancourt is a 28 y.o. female who is here for a routine exam.     Concerns today:  Denies specific GYN concerns today     Patient's last menstrual period was 2024.   Periods are regular every 25-26 days, lasting 5 days.   Dysmenorrhea:none.   Cyclic symptoms include none.     Sexual Activity: sexually active, male partners; Patient reports 1 partners in the last 12 months.  Pain with intercourse? No   Loss of desire? No   History of prior STI: none    Current contraception: coitus interruptus    Last pap: never  History of abnormal Pap smear: no  Family history of uterine or ovarian cancer: no    Last mammogram: never  History of abnormal mammogram: no  Family history of breast cancer: no  Menstrual History:  OB History          0    Para   0    Term   0       0    AB   0    Living   0         SAB   0    IAB   0    Ectopic   0    Multiple   0    Live Births   0                  Patient's last menstrual period was 2024.       Objective   /74   Ht 1.549 m (5' 1\")   Wt 62.1 kg (137 lb)   LMP 2024   BMI 25.89 kg/m²   Physical Exam  Constitutional:       Appearance: Normal appearance.   Genitourinary:      Vulva and rectum normal.      No lesions in the vagina.      Right Labia: No rash, tenderness, lesions, skin changes or Bartholin's cyst.     Left Labia: No " tenderness, lesions, skin changes, Bartholin's cyst or rash.     No vaginal discharge, erythema, tenderness, bleeding, ulceration or granulation tissue.      No vaginal prolapse present.     No vaginal atrophy present.       Right Adnexa: not tender, not full and no mass present.     Left Adnexa: not tender, not full and no mass present.     Cervix is nulliparous.      No cervical motion tenderness, discharge, friability, lesion, polyp or nabothian cyst.      Uterus is not enlarged, fixed, tender or irregular.      No uterine mass detected.  Breasts:     Breasts are soft.     Right: Normal.      Left: Normal.   Cardiovascular:      Rate and Rhythm: Normal rate and regular rhythm.   Pulmonary:      Effort: Pulmonary effort is normal.      Breath sounds: Normal breath sounds.   Abdominal:      General: Abdomen is flat. Bowel sounds are normal.      Palpations: Abdomen is soft.   Musculoskeletal:         General: Normal range of motion.      Cervical back: Normal range of motion.   Neurological:      General: No focal deficit present.      Mental Status: She is alert and oriented to person, place, and time. Mental status is at baseline.   Skin:     General: Skin is warm and dry.   Psychiatric:         Mood and Affect: Mood normal.         Behavior: Behavior normal.         Thought Content: Thought content normal.         Judgment: Judgment normal.     Will contact pt with lab results  RTO in 1 year for annual exam     MICHELLE Collier

## 2024-09-05 ENCOUNTER — APPOINTMENT (OUTPATIENT)
Dept: GASTROENTEROLOGY | Facility: CLINIC | Age: 29
End: 2024-09-05
Payer: MEDICAID

## 2024-09-05 DIAGNOSIS — A04.8 H. PYLORI INFECTION: Primary | ICD-10-CM

## 2024-09-05 DIAGNOSIS — D50.9 IRON DEFICIENCY ANEMIA, UNSPECIFIED IRON DEFICIENCY ANEMIA TYPE: ICD-10-CM

## 2024-09-05 LAB
ANA SER QL HEP2 SUBST: NEGATIVE
BACTERIAL VAGINOSIS VAG-IMP: NORMAL
C TRACH RRNA SPEC QL NAA+PROBE: NEGATIVE
CLUE CELLS VAG LPF-#/AREA: NORMAL /[LPF]
N GONORRHOEA DNA SPEC QL PROBE+SIG AMP: NEGATIVE
NUGENT SCORE: 6
YEAST VAG WET PREP-#/AREA: NORMAL

## 2024-09-05 PROCEDURE — 99214 OFFICE O/P EST MOD 30 MIN: CPT | Performed by: INTERNAL MEDICINE

## 2024-09-05 NOTE — PROGRESS NOTES
Primary Care Provider: Cesia Barrios DO  Referring Provider: No ref. provider found  My final recommendations will be communicated back to the referring physician and/or primary care provider via shared medical records or via US mail.    Chief Complaint (Reason for visit):   Belinda Betancourt is a 28 y.o. female who presents for iron deficiency anemia    ASSESSMENT AND PLAN     Assessment & Plan  H. pylori infection  I had an extensive discussion with patient and her sister out H. pylori infection    -I have prescribed her quadruple therapy for now  -Urea breath test in 6 weeks  -Follow-up in office after that       Iron deficiency anemia, unspecified iron deficiency anemia type  Hemoglobin is improving.  Still microcytic    -I will recheck labs at her 3-month visit  -May need capsule endoscopy depending on clinical course         I discussed the risks, benefits and alternative(s) of the procedure(s) with the patient. Pt verbalizes understanding and is willing to proceed. All questions were answered.    Pino Cruz MD  9/5/2024    SUBJECTIVE   HPI: History obtained from patient.  Patient's sister was present with her in the room.    Patient was initially seen by Ender Montiel for RAGHAVENDRA    EGD and Cscopes done by me (scanned under media)    EGD -H. pylori positive.  Treatment has been started  Colonoscopy-nodular mucosa.  Pathology - normal    Past Medical History:   Diagnosis Date    Anemia 6-25-24    Urinary tract infection 2021    Varicella 2004       No past surgical history on file.    Current Outpatient Medications   Medication Sig Dispense Refill    ascorbic acid (Vitamin C) 500 mg ER capsule Take 1 capsule (500 mg) by mouth once daily.      bismuth subsalicylate (Pepto Bismol) 262 mg chewable tablet Chew 2 tablets (524 mg) 4 times a day before meals for 14 days. 112 tablet 0    doxycycline (Monodox) 100 mg capsule Take 1 capsule (100 mg) by mouth 2 times a day for 14 days. Take with at least 8 ounces (large  glass) of water, do not lie down for 30 minutes after 28 capsule 0    ferrous sulfate, 325 mg ferrous sulfate, tablet Take 1 tablet by mouth once a day on Monday, Wednesday, and Friday. 90 tablet 1    LACTOBACILLUS ACIDOPHILUS ORAL Take 1 tablet by mouth once daily in the morning. 1 gummy every morning      metroNIDAZOLE (Flagyl) 500 mg tablet Take 1 tablet (500 mg) by mouth 3 times a day for 14 days. Do not consume alcohol while taking medicine and up to 5 days afterwards 42 tablet 0    nutritional drink (Ensure High Protein) liquid Drink 8mL three times daily 990 mL 11    omeprazole (PriLOSEC) 40 mg DR capsule Take 1 capsule (40 mg) by mouth 2 times a day before meals for 14 days. Take one pill in the morning on empty stomach, 30-45 minutes before eating. Do not crush or chew. 28 capsule 0    pantoprazole (ProtoNix) 40 mg EC tablet Take 1 tablet (40 mg) by mouth once daily. Do not crush, chew, or split. 30 tablet 1    UNABLE TO FIND OTC gummy laxative daily to prevent constipation       No current facility-administered medications for this visit.       Allergies   Allergen Reactions    Clindamycin Hives     OBJECTIVE   PHYSICAL EXAM:  LMP 08/19/2024      LABS/IMAGING/SCOPES  Lab Results   Component Value Date    WBC 7.0 08/07/2024    HGB 11.6 (L) 08/07/2024    HCT 39.3 08/07/2024    MCV 77 (L) 08/07/2024     08/07/2024     Lab Results   Component Value Date    GLUCOSE 93 07/12/2024    CALCIUM 8.6 07/12/2024     07/12/2024    K 4.0 07/12/2024    CO2 18 (L) 07/12/2024     (H) 07/12/2024    BUN 8 07/12/2024    CREATININE 0.43 (L) 07/12/2024     Lab Results   Component Value Date    ALT 7 07/12/2024    AST 14 07/12/2024    ALKPHOS 38 07/12/2024    BILITOT 0.3 07/12/2024           Pino Cruz MD  9/5/2024

## 2024-09-05 NOTE — ASSESSMENT & PLAN NOTE
Hemoglobin is improving.  Still microcytic    -I will recheck labs at her 3-month visit  -May need capsule endoscopy depending on clinical course

## 2024-09-11 LAB
ELECTRONICALLY SIGNED BY: NORMAL
H. PYLORI DRUG SUSCEPTIBILITY RESULTS: NORMAL

## 2024-10-01 ENCOUNTER — APPOINTMENT (OUTPATIENT)
Dept: HEMATOLOGY/ONCOLOGY | Facility: CLINIC | Age: 29
End: 2024-10-01
Payer: MEDICAID

## 2024-10-10 ENCOUNTER — PATIENT OUTREACH (OUTPATIENT)
Dept: PRIMARY CARE | Facility: CLINIC | Age: 29
End: 2024-10-10
Payer: MEDICAID

## 2024-11-01 ENCOUNTER — PATIENT MESSAGE (OUTPATIENT)
Facility: CLINIC | Age: 29
End: 2024-11-01
Payer: MEDICAID

## 2024-11-01 ENCOUNTER — LAB (OUTPATIENT)
Dept: LAB | Facility: LAB | Age: 29
End: 2024-11-01
Payer: MEDICAID

## 2024-11-01 DIAGNOSIS — A04.8 H. PYLORI INFECTION: Primary | ICD-10-CM

## 2024-11-01 DIAGNOSIS — A04.8 H. PYLORI INFECTION: ICD-10-CM

## 2024-11-01 PROCEDURE — 83013 H PYLORI (C-13) BREATH: CPT

## 2024-11-02 LAB — UREA BREATH TEST QL: NEGATIVE

## 2024-11-15 ENCOUNTER — PATIENT MESSAGE (OUTPATIENT)
Dept: PRIMARY CARE | Facility: CLINIC | Age: 29
End: 2024-11-15
Payer: MEDICAID

## 2024-11-15 DIAGNOSIS — R60.0 BILATERAL LOWER EXTREMITY EDEMA: Primary | ICD-10-CM

## 2024-11-19 ENCOUNTER — LAB (OUTPATIENT)
Dept: LAB | Facility: LAB | Age: 29
End: 2024-11-19
Payer: MEDICAID

## 2024-11-19 DIAGNOSIS — R60.0 BILATERAL LOWER EXTREMITY EDEMA: ICD-10-CM

## 2024-11-19 LAB
ALBUMIN SERPL BCP-MCNC: 4.4 G/DL (ref 3.4–5)
ALP SERPL-CCNC: 50 U/L (ref 33–110)
ALT SERPL W P-5'-P-CCNC: 35 U/L (ref 7–45)
ANION GAP SERPL CALC-SCNC: 8 MMOL/L (ref 10–20)
AST SERPL W P-5'-P-CCNC: 28 U/L (ref 9–39)
BILIRUB SERPL-MCNC: 0.3 MG/DL (ref 0–1.2)
BNP SERPL-MCNC: 31 PG/ML (ref 0–99)
BUN SERPL-MCNC: 13 MG/DL (ref 6–23)
CALCIUM SERPL-MCNC: 9.5 MG/DL (ref 8.6–10.3)
CHLORIDE SERPL-SCNC: 104 MMOL/L (ref 98–107)
CO2 SERPL-SCNC: 29 MMOL/L (ref 21–32)
CREAT SERPL-MCNC: 0.55 MG/DL (ref 0.5–1.05)
CREAT UR-MCNC: 71.3 MG/DL (ref 20–320)
EGFRCR SERPLBLD CKD-EPI 2021: >90 ML/MIN/1.73M*2
GLUCOSE SERPL-MCNC: 74 MG/DL (ref 74–99)
MICROALBUMIN UR-MCNC: <7 MG/L
MICROALBUMIN/CREAT UR: NORMAL MG/G{CREAT}
POTASSIUM SERPL-SCNC: 4.2 MMOL/L (ref 3.5–5.3)
PROT SERPL-MCNC: 7.1 G/DL (ref 6.4–8.2)
SODIUM SERPL-SCNC: 137 MMOL/L (ref 136–145)

## 2024-11-19 PROCEDURE — 36415 COLL VENOUS BLD VENIPUNCTURE: CPT

## 2024-11-19 PROCEDURE — 80053 COMPREHEN METABOLIC PANEL: CPT

## 2024-11-19 PROCEDURE — 83880 ASSAY OF NATRIURETIC PEPTIDE: CPT

## 2024-11-19 PROCEDURE — 82043 UR ALBUMIN QUANTITATIVE: CPT

## 2024-11-19 PROCEDURE — 82570 ASSAY OF URINE CREATININE: CPT

## 2024-12-11 ENCOUNTER — OFFICE VISIT (OUTPATIENT)
Dept: HEMATOLOGY/ONCOLOGY | Facility: CLINIC | Age: 29
End: 2024-12-11
Payer: MEDICAID

## 2024-12-11 VITALS
OXYGEN SATURATION: 100 % | HEIGHT: 61 IN | WEIGHT: 141.09 LBS | DIASTOLIC BLOOD PRESSURE: 79 MMHG | HEART RATE: 67 BPM | RESPIRATION RATE: 16 BRPM | BODY MASS INDEX: 26.64 KG/M2 | TEMPERATURE: 96.8 F | SYSTOLIC BLOOD PRESSURE: 123 MMHG

## 2024-12-11 DIAGNOSIS — D50.0 IRON DEFICIENCY ANEMIA DUE TO CHRONIC BLOOD LOSS: ICD-10-CM

## 2024-12-11 DIAGNOSIS — K90.9 IDIOPATHIC STEATORRHEA (HHS-HCC): ICD-10-CM

## 2024-12-11 DIAGNOSIS — D50.8 OTHER IRON DEFICIENCY ANEMIA: Primary | ICD-10-CM

## 2024-12-11 DIAGNOSIS — D64.9 ANEMIA, UNSPECIFIED TYPE: ICD-10-CM

## 2024-12-11 LAB
BASOPHILS # BLD AUTO: 0.02 X10*3/UL (ref 0–0.1)
BASOPHILS NFR BLD AUTO: 0.2 %
EOSINOPHIL # BLD AUTO: 0.2 X10*3/UL (ref 0–0.7)
EOSINOPHIL NFR BLD AUTO: 2.4 %
ERYTHROCYTE [DISTWIDTH] IN BLOOD BY AUTOMATED COUNT: 14.9 % (ref 11.5–14.5)
FERRITIN SERPL-MCNC: 32 NG/ML (ref 8–150)
HCT VFR BLD AUTO: 40.6 % (ref 36–46)
HGB BLD-MCNC: 12.6 G/DL (ref 12–16)
IMM GRANULOCYTES # BLD AUTO: 0.02 X10*3/UL (ref 0–0.7)
IMM GRANULOCYTES NFR BLD AUTO: 0.2 % (ref 0–0.9)
IRON SATN MFR SERPL: 67 % (ref 25–45)
IRON SERPL-MCNC: 215 UG/DL (ref 35–150)
LYMPHOCYTES # BLD AUTO: 1.92 X10*3/UL (ref 1.2–4.8)
LYMPHOCYTES NFR BLD AUTO: 23.1 %
MCH RBC QN AUTO: 29.8 PG (ref 26–34)
MCHC RBC AUTO-ENTMCNC: 31 G/DL (ref 32–36)
MCV RBC AUTO: 96 FL (ref 80–100)
MONOCYTES # BLD AUTO: 0.91 X10*3/UL (ref 0.1–1)
MONOCYTES NFR BLD AUTO: 11 %
NEUTROPHILS # BLD AUTO: 5.23 X10*3/UL (ref 1.2–7.7)
NEUTROPHILS NFR BLD AUTO: 63.1 %
PLATELET # BLD AUTO: 235 X10*3/UL (ref 150–450)
RBC # BLD AUTO: 4.23 X10*6/UL (ref 4–5.2)
TIBC SERPL-MCNC: 320 UG/DL (ref 240–445)
UIBC SERPL-MCNC: 105 UG/DL (ref 110–370)
VIT B12 SERPL-MCNC: 609 PG/ML (ref 211–911)
WBC # BLD AUTO: 8.3 X10*3/UL (ref 4.4–11.3)

## 2024-12-11 PROCEDURE — 3008F BODY MASS INDEX DOCD: CPT | Performed by: PHYSICIAN ASSISTANT

## 2024-12-11 PROCEDURE — 82607 VITAMIN B-12: CPT | Mod: GEALAB | Performed by: PHYSICIAN ASSISTANT

## 2024-12-11 PROCEDURE — 82728 ASSAY OF FERRITIN: CPT | Performed by: PHYSICIAN ASSISTANT

## 2024-12-11 PROCEDURE — 83550 IRON BINDING TEST: CPT | Performed by: PHYSICIAN ASSISTANT

## 2024-12-11 PROCEDURE — 83521 IG LIGHT CHAINS FREE EACH: CPT | Mod: GEALAB | Performed by: PHYSICIAN ASSISTANT

## 2024-12-11 PROCEDURE — 36415 COLL VENOUS BLD VENIPUNCTURE: CPT | Performed by: PHYSICIAN ASSISTANT

## 2024-12-11 PROCEDURE — 99214 OFFICE O/P EST MOD 30 MIN: CPT | Performed by: PHYSICIAN ASSISTANT

## 2024-12-11 PROCEDURE — 85025 COMPLETE CBC W/AUTO DIFF WBC: CPT | Performed by: PHYSICIAN ASSISTANT

## 2024-12-11 ASSESSMENT — PAIN SCALES - GENERAL: PAINLEVEL_OUTOF10: 0-NO PAIN

## 2024-12-11 NOTE — PROGRESS NOTES
Primary Care Provider: Cesia Barrios DO  Referring Provider: Pino Cruz MD, MS  My final recommendations will be communicated back to the referring physician and/or primary care provider via shared medical records or via US mail.    Chief Complaint (Reason for visit):   Belinda Betancourt is a 29 y.o. female who presents for follow up of RAGHAVENDRA    ASSESSMENT AND PLAN     Assessment & Plan  H. pylori infection  S/p treatment (9/2024)  Negative UBT (11/2024)  No upper GI symptoms    - eradicated  - c/w PO iron pills    Orders:    Follow Up In Gastroenterology    Iron deficiency anemia, unspecified iron deficiency anemia type  Hb has responded to PO iron and eradication of H.pylori  8/2024 - eGD/Cscope - reassuring (H.pylori ->Rxed)    - c/w PO iron pills every other day with Vit C  - f/up with OB GYN         Pino Cruz MD, MS    SUBJECTIVE   HPI: History obtained from patient    Pt feels better. No upper GI symptoms    Past Medical History:   Diagnosis Date    Anemia 6-25-24    Urinary tract infection 2021    Varicella 2004       No past surgical history on file.    Current Outpatient Medications   Medication Sig Dispense Refill    ascorbic acid (Vitamin C) 500 mg ER capsule Take 1 capsule (500 mg) by mouth once daily.      ferrous sulfate, 325 mg ferrous sulfate, tablet Take 1 tablet by mouth once a day on Monday, Wednesday, and Friday. 90 tablet 1    LACTOBACILLUS ACIDOPHILUS ORAL Take 1 tablet by mouth once daily in the morning. 1 gummy every morning      nutritional drink (Ensure High Protein) liquid Drink 8mL three times daily 990 mL 11    UNABLE TO FIND OTC gummy laxative daily to prevent constipation      omeprazole (PriLOSEC) 40 mg DR capsule Take 1 capsule (40 mg) by mouth 2 times a day before meals for 14 days. Take one pill in the morning on empty stomach, 30-45 minutes before eating. Do not crush or chew. 28 capsule 0    pantoprazole (ProtoNix) 40 mg EC tablet Take 1 tablet (40 mg) by mouth once  "daily. Do not crush, chew, or split. 30 tablet 1    polyethylene glycol (Glycolax, Miralax) 17 gram/dose powder TAKE 238 GRAM BY MOUTH ONCE FOR 1 DOSE. USE AS DIRECTED BY  ENDOSCOPY DEPARTMENT FOR COLONOSCOPY (Patient not taking: Mix of powder and drink. Reported on 12/13/2024)       No current facility-administered medications for this visit.       Allergies   Allergen Reactions    Clindamycin Hives     OBJECTIVE   PHYSICAL EXAM:  Pulse 70   Ht 1.549 m (5' 1\")   Wt 63 kg (139 lb)   BMI 26.26 kg/m²      LABS/IMAGING/SCOPES  Lab Results   Component Value Date    WBC 8.3 12/11/2024    HGB 12.6 12/11/2024    HCT 40.6 12/11/2024    MCV 96 12/11/2024     12/11/2024     Lab Results   Component Value Date    GLUCOSE 74 11/19/2024    CALCIUM 9.5 11/19/2024     11/19/2024    K 4.2 11/19/2024    CO2 29 11/19/2024     11/19/2024    BUN 13 11/19/2024    CREATININE 0.55 11/19/2024     Lab Results   Component Value Date    ALT 35 11/19/2024    AST 28 11/19/2024    ALKPHOS 50 11/19/2024    BILITOT 0.3 11/19/2024           Pino Cruz MD, MS  "

## 2024-12-12 LAB
KAPPA LC SERPL-MCNC: 1.51 MG/DL (ref 0.33–1.94)
KAPPA LC/LAMBDA SER: 1.41 {RATIO} (ref 0.26–1.65)
LAMBDA LC SERPL-MCNC: 1.07 MG/DL (ref 0.57–2.63)

## 2024-12-13 ENCOUNTER — APPOINTMENT (OUTPATIENT)
Dept: GASTROENTEROLOGY | Facility: EXTERNAL LOCATION | Age: 29
End: 2024-12-13
Payer: MEDICAID

## 2024-12-13 VITALS — BODY MASS INDEX: 26.24 KG/M2 | HEIGHT: 61 IN | HEART RATE: 70 BPM | WEIGHT: 139 LBS

## 2024-12-13 DIAGNOSIS — D50.9 IRON DEFICIENCY ANEMIA, UNSPECIFIED IRON DEFICIENCY ANEMIA TYPE: ICD-10-CM

## 2024-12-13 DIAGNOSIS — A04.8 H. PYLORI INFECTION: Primary | ICD-10-CM

## 2024-12-13 PROCEDURE — 3008F BODY MASS INDEX DOCD: CPT | Performed by: INTERNAL MEDICINE

## 2024-12-13 PROCEDURE — 99213 OFFICE O/P EST LOW 20 MIN: CPT | Performed by: INTERNAL MEDICINE

## 2024-12-13 RX ORDER — POLYETHYLENE GLYCOL 3350 17 G/17G
POWDER, FOR SOLUTION ORAL
COMMUNITY
Start: 2024-07-31

## 2024-12-13 NOTE — LETTER
December 13, 2024     Cesia Barrios DO  [address]  [facility]  [facility] [address]    Patient: Belinda Betancourt   YOB: 1995   Date of Visit: 12/13/2024       Dear Dr. Cesia Barrios DO:    Thank you for referring Belinda Betancourt to me for evaluation. Below are my notes for this consultation.  If you have questions, please do not hesitate to call me. I look forward to following your patient along with you.       Sincerely,     Pino Cruz MD, MS      CC: No Recipients  ______________________________________________________________________________________    Primary Care Provider: Cesia Barrios DO  Referring Provider: Pino Cruz MD, MS  My final recommendations will be communicated back to the referring physician and/or primary care provider via shared medical records or via US mail.    Chief Complaint (Reason for visit):   Belinda Betancourt is a 29 y.o. female who presents for follow up of RAGHAVENDRA    ASSESSMENT AND PLAN     Assessment & Plan  H. pylori infection  S/p treatment (9/2024)  Negative UBT (11/2024)  No upper GI symptoms    - eradicated  - c/w PO iron pills    Orders:  •  Follow Up In Gastroenterology    Iron deficiency anemia, unspecified iron deficiency anemia type  Hb has responded to PO iron and eradication of H.pylori  8/2024 - eGD/Cscope - reassuring (H.pylori ->Rxed)    - c/w PO iron pills every other day with Vit C  - f/up with OB GYN         Pino Cruz MD, MS    SUBJECTIVE   HPI: History obtained from patient    Pt feels better. No upper GI symptoms    Past Medical History:   Diagnosis Date   • Anemia 6-25-24   • Urinary tract infection 2021   • Varicella 2004       No past surgical history on file.    Current Outpatient Medications   Medication Sig Dispense Refill   • ascorbic acid (Vitamin C) 500 mg ER capsule Take 1 capsule (500 mg) by mouth once daily.     • ferrous sulfate, 325 mg ferrous sulfate, tablet Take 1 tablet by mouth once  "a day on Monday, Wednesday, and Friday. 90 tablet 1   • LACTOBACILLUS ACIDOPHILUS ORAL Take 1 tablet by mouth once daily in the morning. 1 gummy every morning     • nutritional drink (Ensure High Protein) liquid Drink 8mL three times daily 990 mL 11   • UNABLE TO FIND OTC gummy laxative daily to prevent constipation     • omeprazole (PriLOSEC) 40 mg DR capsule Take 1 capsule (40 mg) by mouth 2 times a day before meals for 14 days. Take one pill in the morning on empty stomach, 30-45 minutes before eating. Do not crush or chew. 28 capsule 0   • pantoprazole (ProtoNix) 40 mg EC tablet Take 1 tablet (40 mg) by mouth once daily. Do not crush, chew, or split. 30 tablet 1   • polyethylene glycol (Glycolax, Miralax) 17 gram/dose powder TAKE 238 GRAM BY MOUTH ONCE FOR 1 DOSE. USE AS DIRECTED BY  ENDOSCOPY DEPARTMENT FOR COLONOSCOPY (Patient not taking: Mix of powder and drink. Reported on 12/13/2024)       No current facility-administered medications for this visit.       Allergies   Allergen Reactions   • Clindamycin Hives     OBJECTIVE   PHYSICAL EXAM:  Pulse 70   Ht 1.549 m (5' 1\")   Wt 63 kg (139 lb)   BMI 26.26 kg/m²      LABS/IMAGING/SCOPES  Lab Results   Component Value Date    WBC 8.3 12/11/2024    HGB 12.6 12/11/2024    HCT 40.6 12/11/2024    MCV 96 12/11/2024     12/11/2024     Lab Results   Component Value Date    GLUCOSE 74 11/19/2024    CALCIUM 9.5 11/19/2024     11/19/2024    K 4.2 11/19/2024    CO2 29 11/19/2024     11/19/2024    BUN 13 11/19/2024    CREATININE 0.55 11/19/2024     Lab Results   Component Value Date    ALT 35 11/19/2024    AST 28 11/19/2024    ALKPHOS 50 11/19/2024    BILITOT 0.3 11/19/2024           Pino Cruz MD, MS  "

## 2024-12-13 NOTE — ASSESSMENT & PLAN NOTE
Hb has responded to PO iron and eradication of H.pylori  8/2024 - eGD/Cscope - reassuring (H.pylori ->Rxed)    - c/w PO iron pills every other day with Vit C  - f/up with OB GYN

## 2024-12-31 ENCOUNTER — HOSPITAL ENCOUNTER (EMERGENCY)
Facility: HOSPITAL | Age: 29
Discharge: HOME | End: 2024-12-31
Payer: MEDICAID

## 2024-12-31 ENCOUNTER — TELEPHONE (OUTPATIENT)
Dept: OBSTETRICS AND GYNECOLOGY | Facility: CLINIC | Age: 29
End: 2024-12-31
Payer: MEDICAID

## 2024-12-31 VITALS
HEIGHT: 61 IN | SYSTOLIC BLOOD PRESSURE: 124 MMHG | WEIGHT: 137 LBS | RESPIRATION RATE: 18 BRPM | OXYGEN SATURATION: 97 % | DIASTOLIC BLOOD PRESSURE: 73 MMHG | BODY MASS INDEX: 25.86 KG/M2 | TEMPERATURE: 97.8 F | HEART RATE: 82 BPM

## 2024-12-31 DIAGNOSIS — N93.9 ABNORMAL UTERINE BLEEDING: Primary | ICD-10-CM

## 2024-12-31 LAB
ALBUMIN SERPL BCP-MCNC: 4.3 G/DL (ref 3.4–5)
ALP SERPL-CCNC: 57 U/L (ref 33–110)
ALT SERPL W P-5'-P-CCNC: 36 U/L (ref 7–45)
ANION GAP SERPL CALC-SCNC: 10 MMOL/L (ref 10–20)
APPEARANCE UR: ABNORMAL
APTT PPP: 32 SECONDS (ref 27–38)
AST SERPL W P-5'-P-CCNC: 24 U/L (ref 9–39)
BACTERIA #/AREA URNS AUTO: ABNORMAL /HPF
BASOPHILS # BLD AUTO: 0.02 X10*3/UL (ref 0–0.1)
BASOPHILS NFR BLD AUTO: 0.3 %
BILIRUB SERPL-MCNC: 0.4 MG/DL (ref 0–1.2)
BILIRUB UR STRIP.AUTO-MCNC: NEGATIVE MG/DL
BUN SERPL-MCNC: 15 MG/DL (ref 6–23)
CALCIUM SERPL-MCNC: 9.6 MG/DL (ref 8.6–10.3)
CHLORIDE SERPL-SCNC: 110 MMOL/L (ref 98–107)
CLUE CELLS SPEC QL WET PREP: NORMAL
CO2 SERPL-SCNC: 26 MMOL/L (ref 21–32)
COLOR UR: YELLOW
CREAT SERPL-MCNC: 0.52 MG/DL (ref 0.5–1.05)
EGFRCR SERPLBLD CKD-EPI 2021: >90 ML/MIN/1.73M*2
EOSINOPHIL # BLD AUTO: 0.12 X10*3/UL (ref 0–0.7)
EOSINOPHIL NFR BLD AUTO: 1.6 %
ERYTHROCYTE [DISTWIDTH] IN BLOOD BY AUTOMATED COUNT: 13.7 % (ref 11.5–14.5)
GLUCOSE SERPL-MCNC: 87 MG/DL (ref 74–99)
GLUCOSE UR STRIP.AUTO-MCNC: NORMAL MG/DL
HCG UR QL IA.RAPID: NEGATIVE
HCT VFR BLD AUTO: 41.8 % (ref 36–46)
HGB BLD-MCNC: 13.6 G/DL (ref 12–16)
IMM GRANULOCYTES # BLD AUTO: 0.03 X10*3/UL (ref 0–0.7)
IMM GRANULOCYTES NFR BLD AUTO: 0.4 % (ref 0–0.9)
INR PPP: 1.1 (ref 0.9–1.1)
KETONES UR STRIP.AUTO-MCNC: ABNORMAL MG/DL
LEUKOCYTE ESTERASE UR QL STRIP.AUTO: NEGATIVE
LYMPHOCYTES # BLD AUTO: 1.92 X10*3/UL (ref 1.2–4.8)
LYMPHOCYTES NFR BLD AUTO: 25 %
MCH RBC QN AUTO: 30.1 PG (ref 26–34)
MCHC RBC AUTO-ENTMCNC: 32.5 G/DL (ref 32–36)
MCV RBC AUTO: 93 FL (ref 80–100)
MONOCYTES # BLD AUTO: 0.7 X10*3/UL (ref 0.1–1)
MONOCYTES NFR BLD AUTO: 9.1 %
MUCOUS THREADS #/AREA URNS AUTO: ABNORMAL /LPF
NEUTROPHILS # BLD AUTO: 4.9 X10*3/UL (ref 1.2–7.7)
NEUTROPHILS NFR BLD AUTO: 63.6 %
NITRITE UR QL STRIP.AUTO: NEGATIVE
NRBC BLD-RTO: 0 /100 WBCS (ref 0–0)
PH UR STRIP.AUTO: 5.5 [PH]
PLATELET # BLD AUTO: 245 X10*3/UL (ref 150–450)
POTASSIUM SERPL-SCNC: 4.2 MMOL/L (ref 3.5–5.3)
PROT SERPL-MCNC: 7.2 G/DL (ref 6.4–8.2)
PROT UR STRIP.AUTO-MCNC: ABNORMAL MG/DL
PROTHROMBIN TIME: 11.9 SECONDS (ref 9.8–12.8)
RBC # BLD AUTO: 4.52 X10*6/UL (ref 4–5.2)
RBC # UR STRIP.AUTO: ABNORMAL /UL
RBC #/AREA URNS AUTO: >20 /HPF
SODIUM SERPL-SCNC: 142 MMOL/L (ref 136–145)
SP GR UR STRIP.AUTO: 1.03
SQUAMOUS #/AREA URNS AUTO: ABNORMAL /HPF
T VAGINALIS SPEC QL WET PREP: NORMAL
TRICHOMONAS REFLEX COMMENT: NORMAL
UROBILINOGEN UR STRIP.AUTO-MCNC: NORMAL MG/DL
WBC # BLD AUTO: 7.7 X10*3/UL (ref 4.4–11.3)
WBC #/AREA URNS AUTO: ABNORMAL /HPF
WBC VAG QL WET PREP: NORMAL
YEAST VAG QL WET PREP: NORMAL

## 2024-12-31 PROCEDURE — 87491 CHLMYD TRACH DNA AMP PROBE: CPT | Mod: AHULAB | Performed by: PHYSICIAN ASSISTANT

## 2024-12-31 PROCEDURE — 81001 URINALYSIS AUTO W/SCOPE: CPT | Performed by: PHYSICIAN ASSISTANT

## 2024-12-31 PROCEDURE — 36415 COLL VENOUS BLD VENIPUNCTURE: CPT | Performed by: PHYSICIAN ASSISTANT

## 2024-12-31 PROCEDURE — 87210 SMEAR WET MOUNT SALINE/INK: CPT | Mod: 59 | Performed by: PHYSICIAN ASSISTANT

## 2024-12-31 PROCEDURE — 85025 COMPLETE CBC W/AUTO DIFF WBC: CPT | Performed by: PHYSICIAN ASSISTANT

## 2024-12-31 PROCEDURE — 85730 THROMBOPLASTIN TIME PARTIAL: CPT | Performed by: PHYSICIAN ASSISTANT

## 2024-12-31 PROCEDURE — 87661 TRICHOMONAS VAGINALIS AMPLIF: CPT | Mod: AHULAB | Performed by: PHYSICIAN ASSISTANT

## 2024-12-31 PROCEDURE — 81025 URINE PREGNANCY TEST: CPT | Performed by: PHYSICIAN ASSISTANT

## 2024-12-31 PROCEDURE — 85610 PROTHROMBIN TIME: CPT | Performed by: PHYSICIAN ASSISTANT

## 2024-12-31 PROCEDURE — 84075 ASSAY ALKALINE PHOSPHATASE: CPT | Performed by: PHYSICIAN ASSISTANT

## 2024-12-31 PROCEDURE — 99283 EMERGENCY DEPT VISIT LOW MDM: CPT

## 2024-12-31 ASSESSMENT — COLUMBIA-SUICIDE SEVERITY RATING SCALE - C-SSRS
2. HAVE YOU ACTUALLY HAD ANY THOUGHTS OF KILLING YOURSELF?: NO
1. IN THE PAST MONTH, HAVE YOU WISHED YOU WERE DEAD OR WISHED YOU COULD GO TO SLEEP AND NOT WAKE UP?: NO
6. HAVE YOU EVER DONE ANYTHING, STARTED TO DO ANYTHING, OR PREPARED TO DO ANYTHING TO END YOUR LIFE?: NO

## 2024-12-31 ASSESSMENT — PAIN DESCRIPTION - PROGRESSION: CLINICAL_PROGRESSION: NOT CHANGED

## 2024-12-31 ASSESSMENT — PAIN - FUNCTIONAL ASSESSMENT: PAIN_FUNCTIONAL_ASSESSMENT: 0-10

## 2024-12-31 ASSESSMENT — PAIN SCALES - GENERAL: PAINLEVEL_OUTOF10: 0 - NO PAIN

## 2024-12-31 NOTE — ED TRIAGE NOTES
"Patient presents to ED from home for heavy bleeding while on her period. Patient's sister who is POA is with her. She states the whole house smells \"fishy\". Patient is bleeding so much that it is getting all over the toilet. Patient is also anemic and on iron supplements.   "

## 2024-12-31 NOTE — ED PROVIDER NOTES
"HPI   Chief Complaint   Patient presents with    Menorrhagia    Vaginitis/Bacterial Vaginosis       History of present illness: 29-year-old female presents for vaginal bleeding for the past 3 days.  Indicates the bleeding is little heavier than her typical menstrual cycle.  Patient went through a thick pad overnight.  Today she has been going through about a pad an hour that is not entirely soaked.  Denies any clotting.  Denies abdominal pain nausea vomiting fevers chills or sweats.  Family indicates that the smell of the pad was very strong and fishy and \"stank up the house\".  Says that she has been sexually with 1 partner last few months.  She has a history of anemia and is worried that it will get worse.  Currently taking iron supplement.  Denies chest pain shortness of breath lightheadedness dizziness nausea vomiting headache.    Review of systems: Constitutional, eye, ENT, cardiovascular, respiratory, gastrointestinal, genitourinary, neurologic, musculoskeletal, dermatologic, hematologic, endocrine systems were evaluated and were negative unless otherwise specified in history of present illness.    Medications: Reviewed and per nursing note.    Family history: Denies relevant medical conditions.    Past medical history: Scoliosis    Social history: Denies tobacco, alcohol, drug use.      Physical exam:    Appearance: Well-developed, well-nourished, nontoxic-appearing, alert and oriented x3. Talking in complete sentences.    HEENT:  Head normocephalic atraumatic, extraocular movements intact, pupils equal round reactive to light, mucous membranes are moist and pink.    NECK:  Nml Inspection, no meningismus, no thyromegaly, no lymphadenopathy, no JVD, trachea is midline.    Respiratory: Clear to auscultation bilaterally with normal bilateral excursion. No wheezes, rhonchi, crackles.    Cardiovascular: Regular rate and rhythm, no murmurs, rubs or gallops. Pulses 2+ symmetrically in the dorsalis pedis and radial " pulses.    Abdomen/GI:  Soft, nontender, nondistended, normal bowel sounds x4. No masses or organomegaly.    :  No CVA tenderness    Genital: Small amount of blood in vaginal fornices with small clots and no active bleeding.  No cervical motion uterine or adnexal tenderness.    Neuro:  Oriented x 3, Speech Clear, cranial nerves grossly intact. Normal sensation to light touch in all 4 extremities.    Musculoskeletal: Patient spontaneously moves all 4 extremities.    Skin:  No cyanosis, clubbing, edema, open wounds, or rashes.            Patient History   Past Medical History:   Diagnosis Date    Anemia 6-25-24    Urinary tract infection 2021    Varicella 2004     No past surgical history on file.  Family History   Problem Relation Name Age of Onset    Arthritis Mother Cherelle Toothman     Diabetes Mother Cherelle Toothman     Diabetes Father Mikey Toothjavon Jr     Heart disease Father Mikey Betancourt Jr     Cancer Maternal Grandfather Alvin Mcdowell     Colon cancer Maternal Grandfather Alvin Link     Hearing loss Maternal Grandfather Alvin Mcdowell     Heart disease Maternal Grandfather Alvin Link     Blood clot Maternal Grandmother Nurys Mcdowell     Stroke Maternal Grandmother Nurysellie Mcdowell     Diabetes Paternal Grandfather Mikey Betancourt Sr     Blood clot Paternal Grandmother Sarah Toothjavon     Diabetes Paternal Grandmother Sarah Toothjavon     Hearing loss Paternal Grandmother aSrah Toothjavon     Stroke Paternal Grandmother Sarah Toothjavon     Vision loss Paternal Grandmother Sarah Toothjavon     Accidental death Father's Brother Barb Toothjavon     Alcohol abuse Mother's Brother Mikey Mcdowell     Early natural death Mother's Brother Mikey Mcdowell     Heart disease Mother's Brother Mikey Jeremias     Cancer Father's Brother Christiano Toothman     Early natural death Father's Brother Christiano Toothman     COPD Father's Brother Jose Alfredo Toothman     Early natural death Father's Brother Horace Betancourt     Heart disease Father's Brother Horace Betancourt     Miscarriages /  Stillbirths Sister Nina Lutz      Social History     Tobacco Use    Smoking status: Never    Smokeless tobacco: Never   Substance Use Topics    Alcohol use: Never    Drug use: Never       Physical Exam   ED Triage Vitals [12/31/24 1258]   Temperature Heart Rate Respirations BP   36.6 °C (97.8 °F) 82 18 124/73      Pulse Ox Temp Source Heart Rate Source Patient Position   97 % Temporal Monitor Sitting      BP Location FiO2 (%)     Left arm --       Physical Exam      ED Course & MDM   Diagnoses as of 12/31/24 1557   Abnormal uterine bleeding                 No data recorded     [place] Coma Scale Score: 15 (12/31/24 1301 : Cesia Pfeiffer, ANNE-MARIE)                           Medical Decision Making  Patient with heavy bleeding and vaginal odor.  Differential diagnosis abnormal uterine bleeding, ectopic pregnancy, threatened miscarriage, cervicitis, PID, UTI.  Examination shows soft nontender abdomen.  Patient has no cardiopulmonary symptoms.    CBC CMP coagulation studies urinalysis hCG gonorrhea chlamydia wet mount orthostatic vital signs ordered.    Diagnostic studies show CBC with normal white blood cell count and normal hemoglobin level, no evidence of anemia.  Coagulation studies normal chemistry showed no significant abnormalities.  Urinalysis showed turbid urine with blood bacteria and some contaminants with no white blood cells or leukocyte esterase.  No clear UTI.  Wet mount is negative for trichomonas clue cells and yeast.  Gonorrhea chlamydia PCR pending.  Patient does not have high concern of STD and would like to wait for results.  She does not want empiric treatment.    Patient was reassured about her hemoglobin levels.  She has no cardiopulmonary symptoms.  Recommend follow-up with her gynecologist for consideration of birth control.    Patient will be discharged to home.  Patient is educated in signs and symptoms of worsening symptoms and reasons to come back to the emergency department.  Will need to  follow up with primary care provider.  Patient does not report social determinants of health impacting ability to obtain care that is needed.  Patient agrees with plan.    This is a transcription.  Text was reviewed for errors, but some transcription errors may remain.  Please call for any questions.          Procedure  Procedures     García Verdugo PA-C  12/31/24 7102

## 2024-12-31 NOTE — TELEPHONE ENCOUNTER
"Patient's sister called in on her behalf, sister is caretaker  Patient is currently on cycle, started 12/28/2024  Patient states she is changing pad every hour  Patient reports \"medium sized soft squishy things coming out\"  Denies s/s of anemia  Per sister, Patient has a hx of anemia, blood transfusion and iron infusions  RN notified sister that for heavy bleeding it is recommended that Patient be seen in the ED for evaluation  Patient and sister verbalize understanding  Elaina Hamm RN      "

## 2025-01-01 LAB
C TRACH RRNA SPEC QL NAA+PROBE: NEGATIVE
N GONORRHOEA DNA SPEC QL PROBE+SIG AMP: NEGATIVE
T VAGINALIS RRNA SPEC QL NAA+PROBE: NEGATIVE

## 2025-01-10 NOTE — PROGRESS NOTES
Subjective   Patient ID: Belinda Betancourt is a 29 y.o. female who presents for No chief complaint on file..  Pt reports a change in menstrual cycle last month that she is concerned about. Reports her cycles are typically every 28 days but in December she had a cycle only 21 days apart. The first cycle in the beginning of December lasted 5 days and was a normal flow. The end of December she had a 6 day cycle where she was saturating pads and having to change her pad hourly. No associated pain. She was seen in the ER and many labs were drawn all resulting normally. She denies current vaginal bleeding or pelvic pain. She is sexually active but not on a birth control at this time. HCG negative in ER.     Pt may be interested in hormonal contraception or IUD following imaging if normal. Will plan too discuss following review of imaging results.         Review of Systems   Constitutional:  Negative for activity change, chills, fatigue, fever and unexpected weight change.   Respiratory: Negative.     Gastrointestinal: Negative.    Endocrine: Negative.    Genitourinary:  Positive for menstrual problem. Negative for decreased urine volume, difficulty urinating, dyspareunia, dysuria, enuresis, flank pain, frequency, genital sores, hematuria, pelvic pain, urgency, vaginal bleeding, vaginal discharge and vaginal pain.   Musculoskeletal: Negative.    Skin: Negative.    Neurological: Negative.    Psychiatric/Behavioral: Negative.         Objective   Physical Exam  Constitutional:       Appearance: Normal appearance.   Cardiovascular:      Rate and Rhythm: Normal rate.   Pulmonary:      Effort: Pulmonary effort is normal.   Abdominal:      General: Abdomen is flat.   Musculoskeletal:         General: Normal range of motion.      Cervical back: Normal range of motion.   Skin:     General: Skin is dry.   Neurological:      General: No focal deficit present.      Mental Status: She is alert and oriented to person, place, and time.  Mental status is at baseline.   Psychiatric:         Mood and Affect: Mood normal.         Thought Content: Thought content normal.         Judgment: Judgment normal.         Assessment/Plan   Diagnoses and all orders for this visit:  Abnormal uterine bleeding (AUB)  -     US PELVIS TRANSABDOMINAL WITH TRANSVAGINAL; Future  Abnormal uterine bleeding  -     Referral to Gynecology           MICHELLE Savage 01/09/25 7:16 PM

## 2025-01-14 ENCOUNTER — APPOINTMENT (OUTPATIENT)
Facility: CLINIC | Age: 30
End: 2025-01-14
Payer: MEDICAID

## 2025-01-15 ENCOUNTER — OFFICE VISIT (OUTPATIENT)
Dept: OBSTETRICS AND GYNECOLOGY | Facility: CLINIC | Age: 30
End: 2025-01-15
Payer: MEDICAID

## 2025-01-15 VITALS
HEIGHT: 61 IN | WEIGHT: 138 LBS | BODY MASS INDEX: 26.06 KG/M2 | DIASTOLIC BLOOD PRESSURE: 80 MMHG | SYSTOLIC BLOOD PRESSURE: 127 MMHG

## 2025-01-15 DIAGNOSIS — N93.9 ABNORMAL UTERINE BLEEDING (AUB): Primary | ICD-10-CM

## 2025-01-15 DIAGNOSIS — N93.9 ABNORMAL UTERINE BLEEDING: ICD-10-CM

## 2025-01-15 PROCEDURE — 3008F BODY MASS INDEX DOCD: CPT | Performed by: ADVANCED PRACTICE MIDWIFE

## 2025-01-15 PROCEDURE — 99213 OFFICE O/P EST LOW 20 MIN: CPT | Performed by: ADVANCED PRACTICE MIDWIFE

## 2025-01-15 ASSESSMENT — ENCOUNTER SYMPTOMS
NEUROLOGICAL NEGATIVE: 0
GASTROINTESTINAL NEGATIVE: 1
HEMATOLOGIC/LYMPHATIC NEGATIVE: 0
PSYCHIATRIC NEGATIVE: 1
EYES NEGATIVE: 0
GASTROINTESTINAL NEGATIVE: 0
DYSURIA: 0
ENDOCRINE NEGATIVE: 1
CONSTITUTIONAL NEGATIVE: 0
ENDOCRINE NEGATIVE: 0
MUSCULOSKELETAL NEGATIVE: 0
FLANK PAIN: 0
UNEXPECTED WEIGHT CHANGE: 0
HEMATURIA: 0
FREQUENCY: 0
NEUROLOGICAL NEGATIVE: 1
FATIGUE: 0
ACTIVITY CHANGE: 0
RESPIRATORY NEGATIVE: 1
RESPIRATORY NEGATIVE: 0
MUSCULOSKELETAL NEGATIVE: 1
CHILLS: 0
DIFFICULTY URINATING: 0
ALLERGIC/IMMUNOLOGIC NEGATIVE: 0
FEVER: 0
CARDIOVASCULAR NEGATIVE: 0
PSYCHIATRIC NEGATIVE: 0

## 2025-01-15 ASSESSMENT — PAIN SCALES - GENERAL: PAINLEVEL_OUTOF10: 0-NO PAIN

## 2025-01-28 ENCOUNTER — HOSPITAL ENCOUNTER (OUTPATIENT)
Dept: RADIOLOGY | Facility: CLINIC | Age: 30
Discharge: HOME | End: 2025-01-28
Payer: MEDICAID

## 2025-01-28 ENCOUNTER — APPOINTMENT (OUTPATIENT)
Facility: CLINIC | Age: 30
End: 2025-01-28
Payer: MEDICAID

## 2025-01-28 DIAGNOSIS — N93.9 ABNORMAL UTERINE BLEEDING (AUB): ICD-10-CM

## 2025-01-28 PROCEDURE — 76830 TRANSVAGINAL US NON-OB: CPT | Performed by: RADIOLOGY

## 2025-01-28 PROCEDURE — 76856 US EXAM PELVIC COMPLETE: CPT | Performed by: RADIOLOGY

## 2025-01-28 PROCEDURE — 76856 US EXAM PELVIC COMPLETE: CPT

## 2025-02-22 NOTE — PROGRESS NOTES
Nutrition Initial Assessment:     Patient Belinda Betancourt is a 29 y.o. female being seen at [facility] who was referred by Dr Garnett on 7/12/24 for   1. Anemia, unspecified type  Referral to Nutrition Services          Nutrition Assessment    Patient Active Problem List   Diagnosis    Anemia    Idiopathic scoliosis and kyphoscoliosis         Nutrition History:  Food & Nutrition History: Pt moved in with sister last year, had lots of medical complications.  She can eat more iron but worried about polyups. She drinks 1-2 Boost HP/day. Works at [employer] 4 days a week, brings food to work. She likes to do laundry. She likes potato chips and cookies.  Food Allergies: Gluten/wheat;  Food Intolerances: none  Vitamin/mineral intake: Other (Comment) (Vit C) Iron  Herbal supplements:    Medication and Complementary/Alternative Medicine Use: OTC Medication Use: probiotic  GI Symptoms:  (takes laxative to help with iron and constipation)  Mouth Issues:  ; Teeth Issues:    Sleep Habits: 5-6 hrs continuous (wake: 5-6 am  bed:)    Diet Recall:  Meal 1: 8 am - cereal - pumpkin spiced cheerios, soy milk or tonny natacha - breakfast sandwich, granola bar, sometimes nuts  Snack 1: Boost HP  Meal 2: noon - 12:30  -uncrustable PB sandwich, cheese stick, go go squeeze, chips at work. At home - pizza bagel bites. sometimes hawaiian rolls  Snack 2:    Meal 3: 5- 6 pm - lean cuisine or meatball sub. Last night - chicken nuggets, 2 hawaiian rolls  Snack 3:    Food Variety:    Oral Nutrition Supplement Use: Oral Nutrition Supplements: Boost High Protein   Frequency: 1-2      Fluid Intake: water - was drinking 64 oz but not now, OJ - occassionally, pop - 1 bottle/week, sweet tea -  a few times per day  Energy Intake: Good > 75 %      Food Preparation:  Cooking: Patient  Grocery Shopping: Patient  Dining Out: 1 to 3 times a month    Physical Activity:   active at work    Food Security/Insecurity: Food / Nutrition Program  Participation:  (no issues)    Anthropometrics:                            Weight History:   Daily Weight  01/15/25 : 62.6 kg (138 lb)  12/31/24 : 62.1 kg (137 lb)  12/13/24 : 63 kg (139 lb)  12/11/24 : 64 kg (141 lb 1.5 oz)  09/04/24 : 62.1 kg (137 lb)  08/07/24 : 61.2 kg (134 lb 14.7 oz)  07/31/24 : 62.3 kg (137 lb 6.4 oz)  07/24/24 : 60.8 kg (134 lb)  07/11/24 : 56.2 kg (124 lb)  07/10/24 : 56.7 kg (125 lb)    Weight Change %:  13# weight gain x 6 months  Weight History / % Weight Change: not sure what her usual weight is    Nutrition Focused Physical Exam Findings:  Subcutaneous Fat Loss:   Defer Subcutaneous Fat Loss Assessment: Defer all    Muscle Wasting:  Defer Muscle Wasting Assessment: Defer all    Physical Findings:  Hair:    Eyes:    Nails:    Skin:    Respiratory:    Edema:        Nutrition Significant Labs:  Last Chem:     Chemistry    Lab Results   Component Value Date/Time     12/31/2024 1412    K 4.2 12/31/2024 1412     (H) 12/31/2024 1412    CO2 26 12/31/2024 1412    BUN 15 12/31/2024 1412    CREATININE 0.52 12/31/2024 1412    Lab Results   Component Value Date/Time    CALCIUM 9.6 12/31/2024 1412    ALKPHOS 57 12/31/2024 1412    AST 24 12/31/2024 1412    ALT 36 12/31/2024 1412    BILITOT 0.4 12/31/2024 1412       , CBC Trend:   Recent Labs     12/31/24  1412 12/11/24  0905 08/07/24  1053 07/24/24  1158 07/12/24  0550   WBC 7.7 8.3 7.0 6.5 9.0   NRBC 0.0  --   --  0.0 0.0   RBC 4.52 4.23 5.14 5.10 4.33   HGB 13.6 12.6 11.6* 10.6* 7.6*   HCT 41.8 40.6 39.3 37.3 27.6*   MCV 93 96 77* 73* 64*   MCH 30.1 29.8 22.6* 20.8* 17.6*   MCHC 32.5 31.0* 29.5* 28.4* 27.5*   RDW 13.7 14.9*  --   --  23.7*    235 278 377 314   , LFT Trend:   Recent Labs     12/31/24  1412 11/19/24  0851 08/07/24  1053 07/12/24  0550   ALBUMIN 4.3 4.4  --  3.7   BILITOT 0.4 0.3  --  0.3   ALKPHOS 57 50  --  38   ALT 36 35  --  7   AST 24 28  --  14   PROT 7.2 7.1 7.7 6.2*   , DM Specific Labs Trend (includes  "HgbA1C):   Recent Labs     07/10/24  0837   HGBA1C 5.7*   , Lipid Panel Trend:    Recent Labs     07/10/24  0837   CHOL 116   HDL 55.0   LDLCALC 49   VLDL 12   TRIG 60   , Iron Panel + Serum Ferritin Trend:   Recent Labs     12/11/24  0905 08/07/24  1053 07/10/24  0837   IRON 215* 53 13*   UIBC 105* 340 429*   TIBC 320 393 442   IRONSAT 67* 13* 3   FERRITIN 32 49 8   , Vitamin B12:   Lab Results   Component Value Date    ADVVXGAK20 609 12/11/2024    , Folate:   Lab Results   Component Value Date    FOLATE 14.0 08/07/2024    , and Vitamin D: No results found for: \"VITD25\"     Medications:  Current Outpatient Medications   Medication Instructions    ascorbic acid (VITAMIN C) 500 mg, Daily    ferrous sulfate, 325 mg ferrous sulfate, tablet 1 tablet, oral, Every Mon/Wed/Fri    LACTOBACILLUS ACIDOPHILUS ORAL 1 tablet, Every morning    nutritional drink (Ensure High Protein) liquid Drink 8mL three times daily    omeprazole (PRILOSEC) 40 mg, oral, 2 times daily before meals, Take one pill in the morning on empty stomach, 30-45 minutes before eating. Do not crush or chew.    pantoprazole (PROTONIX) 40 mg, oral, Daily, Do not crush, chew, or split.    polyethylene glycol (Glycolax, Miralax) 17 gram/dose powder TAKE 238 GRAM BY MOUTH ONCE FOR 1 DOSE. USE AS DIRECTED BY  ENDOSCOPY DEPARTMENT FOR COLONOSCOPY    UNABLE TO FIND OTC gummy laxative daily to prevent constipation        Estimated Needs: did not discuss   ;     ;     ;     ;                    Nutrition Diagnosis   Malnutrition Diagnosis  Patient has Malnutrition Diagnosis: No    Nutrition Diagnosis  Patient has Nutrition Diagnosis: Yes  Nutrition Diagnosis 1: Food and nutrition related knowledge deficit  Related to (1): Lack of or limited prior nutrition-related education  As Evidenced by (1): diet recall, need for heart healthy diet, high  iron  Additional Nutrition Diagnosis: Diagnosis 2  Nutrition Diagnosis 2: Altered nutrition related to laboratory " values  Related to (2): organ dysfunction that leads to biochemical changes  As Evidenced by (2): elevated A1c of 5.7%       Nutrition Interventions:  1) Reviewed Dash Diet for overall health:       a) Discussed appropriate portion of carbs per day (6-8 serving), especially to help with energy, rec whole grain to ensure adequate fiber.       b) Reviewed adequate intake of vegetables       c) Attempt to 4-5 servings of fruit per day       d) Discussed source of dairy which are important to help meet daily calcium needs       e) Attempt to consume 6 oz of lean protein per day, focus on plant proteins as well - beans, legumes       f) limited sweets       g) focus on heart healthy fats - Omega 3 and Omega 6 - olive oil    2)  Tips for Adding Iron to Your Eating Plan  Iron from meat, fish, and poultry is better absorbed than iron from plants.  Include foods high in vitamin C such as citrus juice and fruits, melons, dark green leafy vegetables, and potatoes with your meals.  This may help your body absorb more iron.  Eat enriched or fortified grain products.  Limit coffee and tea at meal times so as not to decrease iron absorption.  Some cereals contain 18 mg iron per serving (such iron-fortified bran or whole grain).    Women (ages 19-50 years): 18 mg iron per day  Women (ages 19-50 years): 27 mg if pregnant; 9 mg if breastfeeding  Men (ages 19 years and older): 8 mg iron per day  Older women (ages 51 years and older): 8 mg iron per day     3) Discussed having a piece of fresh fruit, fresh/frozen vegetable everyday.  Try whole grain bread, decrease hawaiian rolls. Try spinach, salmon, beans. Less frozen, breaded chicken.  Consider  trying air fryer    Nutrition Goals:  Nutrition Goals : Consume 64 oz water  A1c < 5.7%  Compliance with dash,heart healthy diet    Nutrition Recommendations:  1)  Rec re-checking iron,to determine if high iron intake still appropriate, last iron was elevated  2) Can consider eliminating  Boost HP if weight gain continues    Educational Handouts JBL: Iron Deficiency, DASH    Follow Up: Patient declined nutrition follow up appointment. This service will remain available if patient wishes to schedule a follow up. Referral is good for 1 year (expires on 7/13/25). If patient wishes to schedule a follow up, can call Nutrition Scheduling Line at [phone].

## 2025-02-24 ENCOUNTER — APPOINTMENT (OUTPATIENT)
Dept: NUTRITION | Facility: CLINIC | Age: 30
End: 2025-02-24
Payer: MEDICAID

## 2025-02-24 DIAGNOSIS — D64.9 ANEMIA, UNSPECIFIED TYPE: Primary | ICD-10-CM

## 2025-02-24 PROCEDURE — 97802 MEDICAL NUTRITION INDIV IN: CPT | Performed by: STUDENT IN AN ORGANIZED HEALTH CARE EDUCATION/TRAINING PROGRAM

## 2025-03-21 ENCOUNTER — OFFICE VISIT (OUTPATIENT)
Dept: NEUROLOGY | Facility: CLINIC | Age: 30
End: 2025-03-21
Payer: MEDICAID

## 2025-03-21 VITALS
WEIGHT: 137 LBS | DIASTOLIC BLOOD PRESSURE: 70 MMHG | RESPIRATION RATE: 14 BRPM | HEART RATE: 66 BPM | BODY MASS INDEX: 25.89 KG/M2 | SYSTOLIC BLOOD PRESSURE: 115 MMHG

## 2025-03-21 DIAGNOSIS — D50.8 OTHER IRON DEFICIENCY ANEMIA: ICD-10-CM

## 2025-03-21 DIAGNOSIS — F79 INTELLECTUAL DISABILITY: Primary | ICD-10-CM

## 2025-03-21 PROCEDURE — 99214 OFFICE O/P EST MOD 30 MIN: CPT | Mod: GC | Performed by: PSYCHIATRY & NEUROLOGY

## 2025-03-21 PROCEDURE — 99204 OFFICE O/P NEW MOD 45 MIN: CPT | Performed by: PSYCHIATRY & NEUROLOGY

## 2025-03-21 PROCEDURE — 1036F TOBACCO NON-USER: CPT | Performed by: PSYCHIATRY & NEUROLOGY

## 2025-03-21 ASSESSMENT — ENCOUNTER SYMPTOMS
DEPRESSION: 0
LOSS OF SENSATION IN FEET: 0
OCCASIONAL FEELINGS OF UNSTEADINESS: 1

## 2025-03-21 ASSESSMENT — PATIENT HEALTH QUESTIONNAIRE - PHQ9
1. LITTLE INTEREST OR PLEASURE IN DOING THINGS: NOT AT ALL
2. FEELING DOWN, DEPRESSED OR HOPELESS: NOT AT ALL
SUM OF ALL RESPONSES TO PHQ9 QUESTIONS 1 AND 2: 0
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
## 2025-03-21 NOTE — PROGRESS NOTES
"   Neurological [place]   Belinda Betancourt is a 29 y.o. year old female presenting for {Reason for Visit:73230::\"neurologic evaluation\"}.   Referred by: Lucille Bailon PA-C  PCP: Cesia Barrios DO    3/21/25- Here today with *** for evaluation of Cognitive concerns: ***    Mr/Ms ** is a [age]-year-old with ** years of formal education (**) and PMHx of ** who is presenting today with chief complaint of cognitive changes. Referred by **.       **  is healthcare POA.   ** has been noticing cognitive changes for about ** years. Onset was gradual. Has no trouble with spelling, no names of things, forgets scheduled events but uses a calendar. Forgets conversations. Denies repeating self.     Denies any functional impairment. Lives with **    Mood is \"good.\". Has not noticed any significant depressive symptoms. Denies getting frustrated with memory problems, but does notice memory problem. No excessive worry or anxiety.     Has good appetite.    No change in personality, social disinhibition, loss of empathy, change in dietary preferences, stereotyped or repetitive behaviors.     No visual hallucinations, fluctuating cognition, tremors, REM sleep behavior disorder, falls.     Pertinent history:   Stroke @28924@, on anticoagulant therapy @12582@, Vascular risk factors-   Seizure disorder @16815@  Significant traumatic brain injury @33792@  Psychiatric/ behavioral disorder- Mood- anxiety @86791@, depression @76257@, psychosis @99485@  Prior inpatient delirium @89876@    Functional changes:   Born in ** and raised in **  Sleep: good  Current living situation -   Driving - Independent.   Finances - Independent.   Cooking - cooks.   ADLS - independent.   Medications - Takes own medication; uses a pillbox   Weapons at home: no  Knows 911    Neuropsychiatric symptoms:   Depression - denies depression. Appetite ok. Sleeping fine. No worthlessness/helplessness. No suicidal thoughts, intent or plans.   Anxiety - Denies. "   Psychosis - Denies.   [place] - Denies.   Suicidality - Denies.     Prior Work-up:   TSH  Vit B12  Neuropsychological testing    Past Neuropsychiatric History:  Handedness: right  History of traumatic brain injury: None   History of seizures: None  History of stroke: None  Past psychiatric history: None    PMH/PSH:  As above, in addition    Meds: reviewed as listed    Allergies: reviewed as listed    Family history:   Psychiatric: None.   Dementia: None   Parkinsons disease: None  Huntingtons disease: None  ALS: None    Social History:   Tobacco use -   Alcohol -   Recreational drugs -   . Has ** children  Worked as **         Cognitive evaluation   Prior Neurocognitive assessment ***  Labs (eg. CBC, CMP, TSH, B12, vit D, RPR, HIV, Folate) @27278@  Brain imaging ***   Amyloid PET ***  ApoE testing ***    Neuro Questionnaires  - AD8 (2 or more suggests cognitive impairment)      - PH-2 (3 or more suggests depressive disorder)       - GDS (5-8 mild, 9-11 moderate, 12 or more suggests significant depression)      - NPI-Q Caregiver (>12-18 consider caregiver interventions)        Extensive review of notes in EMR, labs, tests, Interpretation of neuroimaging  ***  No CT head results found for the past 12 months  No CT Angio Head Results found for the past 14 days  No CT Angio Neck Results found for the past  year    No MRI head results found for the past 12 months    No EEG results found for the past 12 months    No results found for this or any previous visit (from the past 4464 hours).  No echocardiogram results found for the past 12 months     Lab Results   Component Value Date    CHOL 116 07/10/2024    TRIG 60 07/10/2024    HDL 55.0 07/10/2024    CHHDL 2.1 07/10/2024    VLDL 12 07/10/2024    NHDL 61 07/10/2024     Lab Results   Component Value Date    BNP 31 11/19/2024    HGBA1C 5.7 (H) 07/10/2024     Lab Results   Component Value Date    GLUCOSE 87 12/31/2024     12/31/2024    K 4.2 12/31/2024      "(H) 12/31/2024    CO2 26 12/31/2024    ANIONGAP 10 12/31/2024    BUN 15 12/31/2024    CREATININE 0.52 12/31/2024    CALCIUM 9.6 12/31/2024    PHOS 3.2 07/12/2024    ALBUMIN 4.3 12/31/2024     Lab Results   Component Value Date    CALCIUM 9.6 12/31/2024    PHOS 3.2 07/12/2024    PROT 7.2 12/31/2024    ALBUMIN 4.3 12/31/2024    AST 24 12/31/2024    ALT 36 12/31/2024    ALKPHOS 57 12/31/2024    BILITOT 0.4 12/31/2024     Lab Results   Component Value Date    WBC 7.7 12/31/2024    HGB 13.6 12/31/2024    HCT 41.8 12/31/2024    MCV 93 12/31/2024     12/31/2024     INR   Date Value Ref Range Status   12/31/2024 1.1 0.9 - 1.1 Final     Lab Results   Component Value Date    TSH 0.70 07/10/2024     Lab Results   Component Value Date    FOLATE 14.0 08/07/2024     No results found for: \"RPR\"  Lab Results   Component Value Date    ZVCIHJDT46 609 12/11/2024     Lab Results   Component Value Date    HIV1X2 Nonreactive 09/04/2024     No results found for: \"VITD25\"     Relevant ROS, Problem list, Past Medical/ Surgical/ Family/ Social history- reviewed and pertinent details noted in history.     Objective     Visit Vitals  Smoking Status Never         Neuro Scores/ Scales:           MMSE:      MOCA/ SLUMS:      NIHSS:       ***    Assessment/Plan   No diagnosis found.    PLAN ***    Brain Health- You can keep your brain healthy by following with your primary doctor to keep your medical conditions well controlled.    Bring your medicines to doctor visits to make sure they are right for you and you are not on too many or unnecessary medicines.    Use a weekly pill box to keep organized.    Eat a heart-healthy diet- like the Mediterranean diet or a plant-based diet- unless you are prescribed a specific diet for your medical conditions.    Do not drink alcohol excessively or smoke.    Keep yourself mentally active and maintain a schedule to stay physically and socially active. Try to challenge yourself by learning something new " like a craft, dance, or computer program.   Exercise regularly and get at least 7 hours of quality sleep at night to be your most alert and active during the day.     If you have cognitive difficulties, things you can do to minimize the impact in your life and compensate include:   Limit the use of any medications that can impair brain attention and memory, including pain medications and sedatives.   Get the most information from the world around you by having the best vision and hearing you can with glasses or hearing aids if needed.    Limit distractions when you are trying to concentrate and focus on one task at a time.    Save the complicated tasks for the time of day that you are most fresh and alert and take breaks as needed.   Have a routine in your day to bolster your memory and make sure you have a system to consistently put important items like keys/ phone in the same place.   Use calendars, notes, alarms and sticky notes to keep you on track.   Maintain a schedule of activities during the day to stay physically, socially, and mentally active.     Community resources:   - Family Caregiver Thida website: http://www.caregiver.org/caregiver/jsp/home.jsp   - The Alzheimer's Association website (www.alz.org) or 24-hour Helpline (1-155.236.3646), resources relevant for all types of cognitive/ behavioral issues and caregiver support.     - [facility], our Savvy Caregiver Program training program is offered several times per year and shares empirically based techniques that prepare and equip caregivers for the demands of caring for a loved one with memory and neurobehavioral deficits. To learn more, call [phone] or contact [email or web address].     Total time spent on the same day of the patient encounter, before/ during / after the visit, encompassing medical record review/ evaluation/ management/ counseling/ documentation is ***  No orders of the defined  types were placed in this encounter.

## 2025-03-21 NOTE — PATIENT INSTRUCTIONS
You were seen for longstanding intellectual disability.   We have placed a community health worker referral that will be able to Craig you on applying for disability and how to get further support with employment and other aspects of her life. There is no concern for an ongoing Neurodegenerative process that would cause worsening of her cognitive status but it will likely remain similar.

## 2025-03-27 ENCOUNTER — PATIENT OUTREACH (OUTPATIENT)
Dept: CARE COORDINATION | Facility: CLINIC | Age: 30
End: 2025-03-27
Payer: MEDICAID

## 2025-03-27 SDOH — HEALTH STABILITY: PHYSICAL HEALTH
HOW OFTEN DO YOU NEED TO HAVE SOMEONE HELP YOU WHEN YOU READ INSTRUCTIONS, PAMPHLETS, OR OTHER WRITTEN MATERIAL FROM YOUR DOCTOR OR PHARMACY?: ALWAYS

## 2025-03-27 SDOH — SOCIAL STABILITY: SOCIAL NETWORK: HOW OFTEN DO YOU GET TOGETHER WITH FRIENDS OR RELATIVES?: THREE TIMES A WEEK

## 2025-03-27 SDOH — SOCIAL STABILITY: SOCIAL NETWORK
DO YOU BELONG TO ANY CLUBS OR ORGANIZATIONS SUCH AS CHURCH GROUPS, UNIONS, FRATERNAL OR ATHLETIC GROUPS, OR SCHOOL GROUPS?: NO

## 2025-03-27 SDOH — ECONOMIC STABILITY: HOUSING INSECURITY: AT ANY TIME IN THE PAST 12 MONTHS, WERE YOU HOMELESS OR LIVING IN A SHELTER (INCLUDING NOW)?: NO

## 2025-03-27 SDOH — SOCIAL STABILITY: SOCIAL INSECURITY
WITHIN THE LAST YEAR, HAVE YOU BEEN RAPED OR FORCED TO HAVE ANY KIND OF SEXUAL ACTIVITY BY YOUR PARTNER OR EX-PARTNER?: NO

## 2025-03-27 SDOH — HEALTH STABILITY: MENTAL HEALTH: HOW OFTEN DO YOU HAVE A DRINK CONTAINING ALCOHOL?: NEVER

## 2025-03-27 SDOH — ECONOMIC STABILITY: TRANSPORTATION INSECURITY: IN THE PAST 12 MONTHS, HAS LACK OF TRANSPORTATION KEPT YOU FROM MEDICAL APPOINTMENTS OR FROM GETTING MEDICATIONS?: NO

## 2025-03-27 SDOH — SOCIAL STABILITY: SOCIAL INSECURITY: ARE YOU MARRIED, WIDOWED, DIVORCED, SEPARATED, NEVER MARRIED, OR LIVING WITH A PARTNER?: NEVER MARRIED

## 2025-03-27 SDOH — HEALTH STABILITY: MENTAL HEALTH: HOW MANY DRINKS CONTAINING ALCOHOL DO YOU HAVE ON A TYPICAL DAY WHEN YOU ARE DRINKING?: PATIENT DOES NOT DRINK

## 2025-03-27 SDOH — SOCIAL STABILITY: SOCIAL INSECURITY: WITHIN THE LAST YEAR, HAVE YOU BEEN HUMILIATED OR EMOTIONALLY ABUSED IN OTHER WAYS BY YOUR PARTNER OR EX-PARTNER?: NO

## 2025-03-27 SDOH — ECONOMIC STABILITY: HOUSING INSECURITY: IN THE LAST 12 MONTHS, WAS THERE A TIME WHEN YOU WERE NOT ABLE TO PAY THE MORTGAGE OR RENT ON TIME?: NO

## 2025-03-27 SDOH — HEALTH STABILITY: MENTAL HEALTH: HOW OFTEN DO YOU HAVE SIX OR MORE DRINKS ON ONE OCCASION?: NEVER

## 2025-03-27 SDOH — SOCIAL STABILITY: SOCIAL NETWORK: IN A TYPICAL WEEK, HOW MANY TIMES DO YOU TALK ON THE PHONE WITH FAMILY, FRIENDS, OR NEIGHBORS?: THREE TIMES A WEEK

## 2025-03-27 SDOH — SOCIAL STABILITY: SOCIAL INSECURITY
WITHIN THE LAST YEAR, HAVE YOU BEEN KICKED, HIT, SLAPPED, OR OTHERWISE PHYSICALLY HURT BY YOUR PARTNER OR EX-PARTNER?: NO

## 2025-03-27 SDOH — ECONOMIC STABILITY: INCOME INSECURITY: IN THE PAST 12 MONTHS HAS THE ELECTRIC, GAS, OIL, OR WATER COMPANY THREATENED TO SHUT OFF SERVICES IN YOUR HOME?: NO

## 2025-03-27 SDOH — ECONOMIC STABILITY: FOOD INSECURITY
WITHIN THE PAST 12 MONTHS, YOU WORRIED THAT YOUR FOOD WOULD RUN OUT BEFORE YOU GOT THE MONEY TO BUY MORE.: SOMETIMES TRUE

## 2025-03-27 SDOH — ECONOMIC STABILITY: FOOD INSECURITY: HOW HARD IS IT FOR YOU TO PAY FOR THE VERY BASICS LIKE FOOD, HOUSING, MEDICAL CARE, AND HEATING?: NOT HARD AT ALL

## 2025-03-27 SDOH — HEALTH STABILITY: PHYSICAL HEALTH: ON AVERAGE, HOW MANY MINUTES DO YOU ENGAGE IN EXERCISE AT THIS LEVEL?: 0 MIN

## 2025-03-27 SDOH — SOCIAL STABILITY: SOCIAL INSECURITY: WITHIN THE LAST YEAR, HAVE YOU BEEN AFRAID OF YOUR PARTNER OR EX-PARTNER?: NO

## 2025-03-27 SDOH — HEALTH STABILITY: PHYSICAL HEALTH: ON AVERAGE, HOW MANY DAYS PER WEEK DO YOU ENGAGE IN MODERATE TO STRENUOUS EXERCISE (LIKE A BRISK WALK)?: 0 DAYS

## 2025-03-27 SDOH — SOCIAL STABILITY: SOCIAL NETWORK: HOW OFTEN DO YOU ATTEND MEETINGS OF THE CLUBS OR ORGANIZATIONS YOU BELONG TO?: NEVER

## 2025-03-27 SDOH — SOCIAL STABILITY: SOCIAL NETWORK: HOW OFTEN DO YOU ATTEND CHURCH OR RELIGIOUS SERVICES?: NEVER

## 2025-03-27 SDOH — ECONOMIC STABILITY: HOUSING INSECURITY: IN THE PAST 12 MONTHS, HOW MANY TIMES HAVE YOU MOVED WHERE YOU WERE LIVING?: 1

## 2025-03-27 ASSESSMENT — LIFESTYLE VARIABLES
SKIP TO QUESTIONS 9-10: 1
AUDIT-C TOTAL SCORE: 0

## 2025-03-27 ASSESSMENT — ACTIVITIES OF DAILY LIVING (ADL): LACK_OF_TRANSPORTATION: NO

## 2025-03-27 NOTE — PROGRESS NOTES
Patient sister is interested in educational/ employment program for her sister, application for disability, snap benefits, and nearby food pantry's in the area, also a counselor to speak with patient about the current changes. Patient lost her father a year ago, mom staying with her other sister, doesn't quite understand what's ago on.   Patient sister explained she needs guidance in keeping up with personal hygienes.

## 2025-03-28 ENCOUNTER — PATIENT OUTREACH (OUTPATIENT)
Dept: CARE COORDINATION | Facility: CLINIC | Age: 30
End: 2025-03-28
Payer: MEDICAID

## 2025-03-28 ENCOUNTER — DOCUMENTATION (OUTPATIENT)
Dept: CARE COORDINATION | Facility: CLINIC | Age: 30
End: 2025-03-28
Payer: MEDICAID

## 2025-03-28 DIAGNOSIS — Z59.41 FOOD INSECURITY: Primary | ICD-10-CM

## 2025-03-28 SDOH — ECONOMIC STABILITY - FOOD INSECURITY: FOOD INSECURITY: Z59.41

## 2025-03-28 NOTE — PROGRESS NOTES
F/U call with patient's sister, confirmed she received resources via email. Concern about apply with social security, sister explained mom not sure about when she applied for patient  no further assistance needed at this time.

## 2025-03-28 NOTE — PROGRESS NOTES
Signed  Creation Time: 3/27/2025  2:28 PM  [facility]     Patient sister is interested in educational/ employment program for her sister, application for disability, snap benefits, and nearby food pantry's in the area, also a counselor to speak with patient about the current changes. Patient lost her father a year ago, mom staying with her other sister, doesn't quite understand what's ago on.   Patient sister explained she needs guidance in keeping up with personal hygienes.

## 2025-03-28 NOTE — PROGRESS NOTES
[facility] ( APPLY FOR SNAP BENEFITS)  [address] [place] [place] [address]  [phone]  8A-4:30P    Social Security Office   [address]  [place] [id number]  [phone]    [place] [facility]  [address] [place] [address]   [phone]   Open Monday - Friday 9am-5pm     Provides a day program for adults with developmental disabilities in an activity center. Provides activities, training and assistance based on the needs of the individual. This may include recreation, crafts, daily living skills, personal hygiene, medication administration, transportation, vocational development, production skills, etc. also provides group homes for those who need 24 hours.      FOOD PANTRIES    [facility]  [address] [place] [id number]  [phone] (SCHEDULE APPOINTMENT)    [facility]  [address] [place] [address]  [phone]  M-F 9:15A-2:30P (CLOSED DURING 11:15A-12:30P)    Basic Needs Assistance   [address] [place] [address]  M-F 8A-4:30P    [facility]  [address] [place], [place] [id number] [phone]  M-F 9A-4P/ Sat. 10-12:30p

## 2025-04-02 ENCOUNTER — PATIENT OUTREACH (OUTPATIENT)
Dept: CARE COORDINATION | Facility: CLINIC | Age: 30
End: 2025-04-02
Payer: MEDICAID

## 2025-05-01 ENCOUNTER — PATIENT MESSAGE (OUTPATIENT)
Dept: PRIMARY CARE | Facility: CLINIC | Age: 30
End: 2025-05-01
Payer: MEDICAID

## 2025-05-02 ENCOUNTER — OFFICE VISIT (OUTPATIENT)
Dept: URGENT CARE | Age: 30
End: 2025-05-02
Payer: MEDICAID

## 2025-05-02 VITALS — HEART RATE: 68 BPM | SYSTOLIC BLOOD PRESSURE: 122 MMHG | OXYGEN SATURATION: 100 % | DIASTOLIC BLOOD PRESSURE: 76 MMHG

## 2025-05-02 DIAGNOSIS — L03.113 CELLULITIS OF RIGHT HAND: Primary | ICD-10-CM

## 2025-05-02 RX ORDER — MUPIROCIN 20 MG/G
OINTMENT TOPICAL
Qty: 22 G | Refills: 0 | Status: SHIPPED | OUTPATIENT
Start: 2025-05-02 | End: 2025-05-12

## 2025-05-02 RX ORDER — DOXYCYCLINE 100 MG/1
100 CAPSULE ORAL 2 TIMES DAILY
Qty: 20 CAPSULE | Refills: 0 | Status: SHIPPED | OUTPATIENT
Start: 2025-05-02 | End: 2025-05-12

## 2025-05-02 ASSESSMENT — ENCOUNTER SYMPTOMS
WOUND: 0
SHORTNESS OF BREATH: 0
VOMITING: 0
NAUSEA: 0
DIARRHEA: 0
WHEEZING: 0
CHILLS: 0
FEVER: 0

## 2025-05-02 NOTE — PROGRESS NOTES
Subjective   Patient ID: Belinda Betancourt is a 29 y.o. female. They present today with a chief complaint of Other (Infection on right hand, concerned for MRSA ).    History of Present Illness  Patient presents with erythematous tender rash on right hand that stated yesterday and worsened over the last 24 hours. Patient explains that she has a history of MRSA requiring wound care in the past. Denies any known injury or bug bites to the area denies numbness, tinging. Denies any red streaking. Denies fever, chills, sweats. Denies n/v/d. Denies chest pain, sob.             Past Medical History  Allergies as of 05/02/2025 - Reviewed 05/02/2025   Allergen Reaction Noted    Clindamycin Hives 06/18/2024       Prescriptions Prior to Admission[1]     Medical History[2]    Surgical History[3]     reports that she has never smoked. She has never used smokeless tobacco. She reports that she does not drink alcohol and does not use drugs.    Review of Systems  Review of Systems   Constitutional:  Negative for chills and fever.   Respiratory:  Negative for shortness of breath and wheezing.    Cardiovascular:  Negative for chest pain.   Gastrointestinal:  Negative for diarrhea, nausea and vomiting.   Skin:  Positive for rash. Negative for wound.                                  Objective    Vitals:    05/02/25 1906   BP: 122/76   Pulse: 68   SpO2: 100%     No LMP recorded.    Physical Exam  Constitutional:       General: She is not in acute distress.     Appearance: Normal appearance. She is not ill-appearing.   HENT:      Head: Normocephalic and atraumatic.   Eyes:      Extraocular Movements: Extraocular movements intact.      Pupils: Pupils are equal, round, and reactive to light.   Pulmonary:      Effort: Pulmonary effort is normal. No respiratory distress.      Breath sounds: Normal breath sounds. No wheezing.   Musculoskeletal:      Cervical back: Normal range of motion.   Skin:     General: Skin is warm.      Capillary Refill:  Capillary refill takes less than 2 seconds.      Findings: Erythema present.      Comments: Raised erythematous rash, warm and tender to touch localized on dorsal lateral right hand. No fluctuance or discharge noted.  Skin on bilateral hands if flaky and scaly dry in nature    Neurological:      Mental Status: She is alert.         Procedures    Point of Care Test & Imaging Results from this visit  No results found for this visit on 05/02/25.   Imaging  No results found.    Cardiology, Vascular, and Other Imaging  No other imaging results found for the past 2 days      Diagnostic study results (if any) were reviewed by Martha Claudio PA-C.    Assessment/Plan   Allergies, medications, history, and pertinent labs/EKGs/Imaging reviewed by Martha Claudio PA-C.     Medical Decision Making  MDM- Signs and symptoms consistent with cellulitis. No evidence of sepsis, abscess, or other complications. Plan is for antibiotic therapy and symptomatic support at home. Will cover for MRSA given patients history. Plan to follow with PCP. Patient advised to return to clinic or go to the ED if symptoms change or worsen. Patient and family member verbalized understanding and agrees with plan.       Orders and Diagnoses  Diagnoses and all orders for this visit:  Cellulitis of right hand  -     doxycycline (Vibramycin) 100 mg capsule; Take 1 capsule (100 mg) by mouth 2 times a day for 10 days. Take with at least 8 ounces (large glass) of water, do not lie down for 30 minutes after  -     mupirocin (Bactroban) 2 % ointment; Apply topically 3 times a day for 10 days.      Medical Admin Record      Patient disposition: Home    Electronically signed by Martha Claudio PA-C  7:12 PM           [1] (Not in a hospital admission)   [2]   Past Medical History:  Diagnosis Date    Anemia 6-25-24    Urinary tract infection 2021    Varicella 2004   [3] No past surgical history on file.

## 2025-05-02 NOTE — PATIENT INSTRUCTIONS
Keep area clean with soap and water and dry    Continue wound care from previous instructions when you saw wound care    Monitor for increased redness, swelling, discharge    Er if any numbness, tingling, joint pain, fever, nausea, vomiting or worsening of symptoms    Follow up with wound care if no improvement.

## 2025-05-10 ENCOUNTER — OFFICE VISIT (OUTPATIENT)
Dept: URGENT CARE | Age: 30
End: 2025-05-10

## 2025-05-10 DIAGNOSIS — Z02.89 ENCOUNTER FOR PHYSICAL EXAMINATION RELATED TO EMPLOYMENT: Primary | ICD-10-CM

## 2025-05-10 ASSESSMENT — ENCOUNTER SYMPTOMS
ALLERGIC/IMMUNOLOGIC NEGATIVE: 1
CARDIOVASCULAR NEGATIVE: 1
ENDOCRINE NEGATIVE: 1
GASTROINTESTINAL NEGATIVE: 1
PSYCHIATRIC NEGATIVE: 1
EYES NEGATIVE: 1
MUSCULOSKELETAL NEGATIVE: 1
HEMATOLOGIC/LYMPHATIC NEGATIVE: 1
CONSTITUTIONAL NEGATIVE: 1
NEUROLOGICAL NEGATIVE: 1
RESPIRATORY NEGATIVE: 1

## 2025-05-10 NOTE — PROGRESS NOTES
Subjective   Patient ID: Belinda Betancourt is a 29 y.o. female. They present today with a chief complaint of Physical.    History of Present Illness  HPI a 29-year-old female arrives to clinic with chief complaint of physical.  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  She reports no past medical or surgical history that is pertinent to her job description.  She has a history of anemia.    Past Medical History  Allergies as of 05/10/2025 - Reviewed 05/10/2025   Allergen Reaction Noted    Clindamycin Hives 06/18/2024       Prescriptions Prior to Admission[1]     Medical History[2]    Surgical History[3]     reports that she has never smoked. She has never used smokeless tobacco. She reports that she does not drink alcohol and does not use drugs.    Review of Systems  Review of Systems   Constitutional: Negative.    HENT: Negative.     Eyes: Negative.    Respiratory: Negative.     Cardiovascular: Negative.    Gastrointestinal: Negative.    Endocrine: Negative.    Genitourinary: Negative.    Musculoskeletal: Negative.    Skin: Negative.    Allergic/Immunologic: Negative.    Neurological: Negative.    Hematological: Negative.    Psychiatric/Behavioral: Negative.     All other systems reviewed and are negative.                                 Objective    There were no vitals filed for this visit.  No LMP recorded.    Physical Exam  Vitals and nursing note reviewed.   Constitutional:       Appearance: Normal appearance.   HENT:      Head: Normocephalic and atraumatic.      Right Ear: Tympanic membrane normal.      Left Ear: Tympanic membrane normal.      Nose: Nose normal.      Mouth/Throat:      Mouth: Mucous membranes are moist.      Pharynx: Oropharynx is clear.   Eyes:      Extraocular Movements: Extraocular movements intact.      Conjunctiva/sclera: Conjunctivae normal.      Pupils: Pupils are equal, round, and reactive to  light.   Cardiovascular:      Rate and Rhythm: Normal rate and regular rhythm.   Pulmonary:      Effort: Pulmonary effort is normal.      Breath sounds: Normal breath sounds.   Abdominal:      General: Bowel sounds are normal.      Palpations: Abdomen is soft.   Musculoskeletal:         General: Normal range of motion.      Cervical back: Normal range of motion and neck supple.   Skin:     General: Skin is warm.      Capillary Refill: Capillary refill takes less than 2 seconds.   Neurological:      General: No focal deficit present.      Mental Status: She is alert and oriented to person, place, and time. Mental status is at baseline.   Psychiatric:         Mood and Affect: Mood normal.         Behavior: Behavior normal.         Thought Content: Thought content normal.         Judgment: Judgment normal.         Procedures    Point of Care Test & Imaging Results from this visit  No results found for this visit on 05/10/25.   Imaging  No results found.    Cardiology, Vascular, and Other Imaging  No other imaging results found for the past 2 days      Diagnostic study results (if any) were reviewed by EDUARDO Caceres.    Assessment/Plan   Allergies, medications, history, and pertinent labs/EKGs/Imaging reviewed by EDUARDO Caceres.     Medical Decision Making  Unremarkable physical examination.  Cleared to work with no restrictions.    Orders and Diagnoses  Diagnoses and all orders for this visit:  Encounter for physical examination related to employment      Medical Admin Record      Patient disposition: Home    Electronically signed by EDUARDO Caceres  2:19 PM           [1] (Not in a hospital admission)   [2]   Past Medical History:  Diagnosis Date    Anemia 6-25-24    Urinary tract infection 2021    Varicella 2004   [3] History reviewed. No pertinent surgical history.

## 2025-05-12 ENCOUNTER — LAB (OUTPATIENT)
Dept: LAB | Facility: HOSPITAL | Age: 30
End: 2025-05-12
Payer: MEDICAID

## 2025-05-12 DIAGNOSIS — D50.8 OTHER IRON DEFICIENCY ANEMIA: ICD-10-CM

## 2025-05-12 DIAGNOSIS — K90.9 IDIOPATHIC STEATORRHEA (HHS-HCC): ICD-10-CM

## 2025-05-12 DIAGNOSIS — D64.9 ANEMIA, UNSPECIFIED: ICD-10-CM

## 2025-05-12 DIAGNOSIS — K90.9 INTESTINAL MALABSORPTION, UNSPECIFIED: Primary | ICD-10-CM

## 2025-05-12 DIAGNOSIS — D64.9 ANEMIA, UNSPECIFIED TYPE: ICD-10-CM

## 2025-05-12 DIAGNOSIS — D50.8 OTHER IRON DEFICIENCY ANEMIAS: ICD-10-CM

## 2025-05-12 DIAGNOSIS — D50.0 IRON DEFICIENCY ANEMIA DUE TO CHRONIC BLOOD LOSS: ICD-10-CM

## 2025-05-12 DIAGNOSIS — D50.0 IRON DEFICIENCY ANEMIA SECONDARY TO BLOOD LOSS (CHRONIC): ICD-10-CM

## 2025-05-12 LAB
BASOPHILS # BLD AUTO: 0.03 X10*3/UL (ref 0–0.1)
BASOPHILS NFR BLD AUTO: 0.4 %
EOSINOPHIL # BLD AUTO: 0.19 X10*3/UL (ref 0–0.7)
EOSINOPHIL NFR BLD AUTO: 2.2 %
ERYTHROCYTE [DISTWIDTH] IN BLOOD BY AUTOMATED COUNT: 13.1 % (ref 11.5–14.5)
FERRITIN SERPL-MCNC: 35 NG/ML (ref 8–150)
HCT VFR BLD AUTO: 44.8 % (ref 36–46)
HGB BLD-MCNC: 13.9 G/DL (ref 12–16)
IMM GRANULOCYTES # BLD AUTO: 0.02 X10*3/UL (ref 0–0.7)
IMM GRANULOCYTES NFR BLD AUTO: 0.2 % (ref 0–0.9)
IRON SATN MFR SERPL: 12 % (ref 25–45)
IRON SERPL-MCNC: 40 UG/DL (ref 35–150)
LYMPHOCYTES # BLD AUTO: 1.96 X10*3/UL (ref 1.2–4.8)
LYMPHOCYTES NFR BLD AUTO: 22.9 %
MCH RBC QN AUTO: 30.2 PG (ref 26–34)
MCHC RBC AUTO-ENTMCNC: 31 G/DL (ref 32–36)
MCV RBC AUTO: 97 FL (ref 80–100)
MONOCYTES # BLD AUTO: 0.56 X10*3/UL (ref 0.1–1)
MONOCYTES NFR BLD AUTO: 6.5 %
NEUTROPHILS # BLD AUTO: 5.8 X10*3/UL (ref 1.2–7.7)
NEUTROPHILS NFR BLD AUTO: 67.8 %
NRBC BLD-RTO: 0 /100 WBCS (ref 0–0)
PLATELET # BLD AUTO: 273 X10*3/UL (ref 150–450)
RBC # BLD AUTO: 4.61 X10*6/UL (ref 4–5.2)
TIBC SERPL-MCNC: 337 UG/DL (ref 240–445)
UIBC SERPL-MCNC: 297 UG/DL (ref 110–370)
WBC # BLD AUTO: 8.6 X10*3/UL (ref 4.4–11.3)

## 2025-05-12 PROCEDURE — 82728 ASSAY OF FERRITIN: CPT

## 2025-05-12 PROCEDURE — 83540 ASSAY OF IRON: CPT

## 2025-05-12 PROCEDURE — 85025 COMPLETE CBC W/AUTO DIFF WBC: CPT

## 2025-05-12 PROCEDURE — 83550 IRON BINDING TEST: CPT

## 2025-05-12 PROCEDURE — 82607 VITAMIN B-12: CPT

## 2025-05-12 PROCEDURE — 36415 COLL VENOUS BLD VENIPUNCTURE: CPT

## 2025-05-13 ENCOUNTER — OFFICE VISIT (OUTPATIENT)
Dept: HEMATOLOGY/ONCOLOGY | Facility: CLINIC | Age: 30
End: 2025-05-13
Payer: MEDICAID

## 2025-05-13 VITALS
RESPIRATION RATE: 16 BRPM | BODY MASS INDEX: 26.58 KG/M2 | SYSTOLIC BLOOD PRESSURE: 113 MMHG | WEIGHT: 140.65 LBS | DIASTOLIC BLOOD PRESSURE: 59 MMHG | HEART RATE: 58 BPM | TEMPERATURE: 97.5 F | OXYGEN SATURATION: 99 %

## 2025-05-13 DIAGNOSIS — D64.9 ANEMIA, UNSPECIFIED TYPE: ICD-10-CM

## 2025-05-13 DIAGNOSIS — D50.8 OTHER IRON DEFICIENCY ANEMIA: ICD-10-CM

## 2025-05-13 DIAGNOSIS — K90.9 IDIOPATHIC STEATORRHEA (HHS-HCC): ICD-10-CM

## 2025-05-13 DIAGNOSIS — D50.0 IRON DEFICIENCY ANEMIA DUE TO CHRONIC BLOOD LOSS: ICD-10-CM

## 2025-05-13 LAB — VIT B12 SERPL-MCNC: 515 PG/ML (ref 211–911)

## 2025-05-13 PROCEDURE — 99214 OFFICE O/P EST MOD 30 MIN: CPT | Performed by: PHYSICIAN ASSISTANT

## 2025-05-13 ASSESSMENT — PAIN SCALES - GENERAL: PAINLEVEL_OUTOF10: 0-NO PAIN

## 2025-05-13 NOTE — PROGRESS NOTES
Reason for Visit  Belinda Betancourt is a 29 y.o. female referred by Dr. Barrios for RAGHAVENDRA.    Patient accompanied by sister and cousin, most of the history from them.    Patient had been living with her parents but only recently moved in with her sister and has taken over her care. Recently established with PCP and blood work showed hgb of 6.7. Patient admitted in 7/2024 and received 1 unit of blood and given 2 doses of IV Venofer. Discharged home on Protonix 40mg daily, Ferrous sulfate 325mg M/W/F.     /P EGD and c-scope in 8/2024 which revealed H. Pylori infection and was treated with quadruple therapy. Repeat Urea breath test was normal.     On assessment, patient has no specific complaints. Notes normal menses. Denies melena or BRBPR. Being followed for rash by derm. Tolerating oral iron. Family reports some sort of cognitive delay but has never been evaluated for it. Otherwise doing well. Denies B symptoms, frequent infections, n/v/d/abd pain, SOB/CP/MIGUEL, neuropathy or  any othetrcomplaints at this time.    PMH/PSH: Scoliosis s/p back brace, Lazy eye, chronic constipation, MRSA Infection (abdomen).  FH: MGF-colon cancer, paternal uncle-lymphoma, lung cancer.  Soc Hx: Denies smoking, ETOH, illicit drugs; Boyfriend, .    History of Present Illness:  Patient presents for follow up accompanied by her sister. She continues on oral iron w/o issues. Per her sister, she has improved her diet and has gained weight. Patient has moderate to heavy menses. Sister reports fatigue but otherwise doing well.    Review of Systems: All of the systems have been reviewed and are negative for complaints except what is stated in the HPI and/or past medical history.    Allergies and Intolerances:  Allergies   Allergen Reactions    Clindamycin Hives     Outpatient Medication Profile:  Current Outpatient Medications   Medication Sig Dispense Refill    ascorbic acid (Vitamin C) 500 mg ER capsule Take 1 capsule (500 mg) by mouth  "once daily.      ferrous sulfate, 325 mg ferrous sulfate, tablet Take 1 tablet by mouth once a day on Monday, Wednesday, and Friday. 90 tablet 1    LACTOBACILLUS ACIDOPHILUS ORAL Take 1 tablet by mouth once daily in the morning. 1 gummy every morning      nutritional drink (Ensure High Protein) liquid Drink 8mL three times daily 990 mL 11    omeprazole (PriLOSEC) 40 mg DR capsule Take 1 capsule (40 mg) by mouth 2 times a day before meals for 14 days. Take one pill in the morning on empty stomach, 30-45 minutes before eating. Do not crush or chew. 28 capsule 0    pantoprazole (ProtoNix) 40 mg EC tablet Take 1 tablet (40 mg) by mouth once daily. Do not crush, chew, or split. 30 tablet 1    polyethylene glycol (Glycolax, Miralax) 17 gram/dose powder TAKE 238 GRAM BY MOUTH ONCE FOR 1 DOSE. USE AS DIRECTED BY  ENDOSCOPY DEPARTMENT FOR COLONOSCOPY (Patient not taking: Mix of powder and drink. Reported on 12/13/2024)      UNABLE TO FIND OTC gummy laxative daily to prevent constipation       No current facility-administered medications for this visit.      Vitals and Measurements:       12/11/2024     9:08 AM 12/13/2024     3:26 PM 12/31/2024    12:58 PM 1/15/2025    11:07 AM 3/21/2025     1:25 PM 5/2/2025     7:06 PM 5/13/2025     9:40 AM   Vitals   Systolic 123  124 127 115 122 113   Diastolic 79  73 80 70 76 59   BP Location Left arm  Left arm  Right arm  Left arm   Heart Rate 67 70 82  66 68 58   Temp 36 °C (96.8 °F)  36.6 °C (97.8 °F)    36.4 °C (97.5 °F)   Resp 16  18  14  16   Height 1.55 m (5' 1.02\")  1.549 m (5' 1\") 1.549 m (5' 1\") 1.549 m (5' 1\")      Weight (lb) 141.09 139 137 138 137  140.65   BMI 26.64 kg/m2 26.26 kg/m2 25.89 kg/m2 26.07 kg/m2 25.89 kg/m2  26.58 kg/m2   BSA (m2) 1.66 m2 1.65 m2 1.63 m2 1.64 m2 1.63 m2  1.66 m2   Visit Report Report Report  Report Report Report Report       Significant value     Physical Exam:   Constitutional: alert, awake and oriented, not in acute distress   HEENT: moist " mucous membranes, normal nose   Neck: supple, no lymphadenopathy   EYES: PERRL, EOM intact, conjunctiva normal  Skin: no jaundice, rash or erythema  Neurological: AAOx3, no gross focal deficit   Psychiatric: normal mood and behavior     Labs:  Lab Results   Component Value Date    WBC 8.6 05/12/2025    NEUTROABS 5.80 05/12/2025    IGABSOL 0.02 05/12/2025    LYMPHSABS 1.96 05/12/2025    MONOSABS 0.56 05/12/2025    EOSABS 0.19 05/12/2025    BASOSABS 0.03 05/12/2025    RBC 4.61 05/12/2025    MCV 97 05/12/2025    MCHC 31.0 (L) 05/12/2025    HGB 13.9 05/12/2025    HCT 44.8 05/12/2025     05/12/2025     Lab Results   Component Value Date    IRON 40 05/12/2025    TIBC 337 05/12/2025    FERRITIN 35 05/12/2025      Lab Results   Component Value Date    STXFPXSQ52 515 05/12/2025      Assessment:    28 y.o. female referred for RAGHAVENDRA.      Plan:    Reviewed and discussed lab, imaging, and pathology results with patient in detail as well as diagnosis, prognosis, and treatment options.    Discussed increasing oral iron to 2x/day as tolerated to see if if prove fatigue    Referral to sleep medicine for TIERNEY vs insomnia.     F/U w/GYN    F/U w/PCP    RTC in 6 months    Patient verbalized understanding, and all her questions were answered to her satisfaction    30 min spent with patient greater than 50 % of which was spent in consultation and coordination of care.

## 2025-05-24 ENCOUNTER — PATIENT MESSAGE (OUTPATIENT)
Dept: PRIMARY CARE | Facility: CLINIC | Age: 30
End: 2025-05-24
Payer: MEDICAID

## 2025-05-24 DIAGNOSIS — D64.9 ANEMIA, UNSPECIFIED TYPE: ICD-10-CM

## 2025-05-27 DIAGNOSIS — D64.9 ANEMIA, UNSPECIFIED TYPE: ICD-10-CM

## 2025-05-27 RX ORDER — FERROUS SULFATE 325(65) MG
1 TABLET ORAL
Qty: 90 TABLET | Refills: 3 | Status: SHIPPED | OUTPATIENT
Start: 2025-05-27 | End: 2025-05-27 | Stop reason: SDUPTHER

## 2025-05-27 RX ORDER — FERROUS SULFATE 325(65) MG
1 TABLET ORAL
Qty: 180 TABLET | Refills: 3 | Status: SHIPPED | OUTPATIENT
Start: 2025-05-27 | End: 2025-05-29 | Stop reason: SDUPTHER

## 2025-05-28 DIAGNOSIS — D64.9 ANEMIA, UNSPECIFIED TYPE: ICD-10-CM

## 2025-05-29 DIAGNOSIS — D64.9 ANEMIA, UNSPECIFIED TYPE: ICD-10-CM

## 2025-05-29 RX ORDER — FERROUS SULFATE 325(65) MG
1 TABLET ORAL EVERY 12 HOURS
Qty: 180 TABLET | Refills: 3 | Status: SHIPPED | OUTPATIENT
Start: 2025-05-29

## 2025-06-02 ENCOUNTER — APPOINTMENT (OUTPATIENT)
Dept: NEUROLOGY | Facility: CLINIC | Age: 30
End: 2025-06-02
Payer: MEDICAID

## 2025-06-17 NOTE — PROGRESS NOTES
"[facility] Sleep Medicine Clinic  New Visit Note        HISTORY OF PRESENT ILLNESS     The patient's referring provider is: Lucille Bailon PA-C    HISTORY OF PRESENT ILLNESS   Belinda Betancourt is a 29 y.o. female who presents to a [facility] Clinic for a sleep medicine evaluation with concerns of Consult and Difficulties Falling Asleep.     PAST SLEEP HISTORY    Patient has the following sleep study results: no past SS    CURRENT HISTORY    Patient is present with her sister who helped with history.    She denies snoring or experiencing apnea during sleep. She reports feeling tired and frequently falls asleep during the daytime, especially when sitting down. She experiences difficulty falling asleep due to playing games on her phone.    Father had TIERNEY and possibly narcolepsy. Mother snores although isn't diagnosed.    She denies any narcolepsy-associated symptoms. No sleep paralysis. No hypnagogic and/or hypnopompic hallucinations. No cataplexy-like episodes.    STOP BANG 1  +T    SLEEP RELATED-ROS:  SLEEP SCHEDULE WEEKDAYS/WORKDAYS  Usual Bedtime: 10 to 11 PM  Falls asleep around: 10:30 to 11:30 PM  Wake time: 3 AM    SLEEP SCHEDULE WEEKENDS/NON-WORKDAYS:  Same    NAPS: Frequently falls asleep during the day, such as in waiting rooms.    AVERAGE SLEEP DURATION: Approximately 4 hours/day    PREFERRED SLEEP POSITION: No preferred position    SLEEP INITIATION: Difficulty falling asleep due to playing games on her phone.    SLEEP MAINTENANCE: Wakes up once per night and does not feel the need to return to sleep.    RECREATIONAL DRUG USE  SMOKING: Never smokes  ALCOHOL: Never drinks alcohol  CAFFEINE: Drinks one caffeinated beverage daily  MARIJUANA: Never uses marijuana    ESS: 4      PHYSICAL EXAM     VITAL SIGNS: /70   Pulse 61   Ht 1.549 m (5' 1\")   Wt 62.6 kg (138 lb)   LMP  (LMP Unknown)   Breastfeeding No   BMI 26.07 kg/m²      PREVIOUS WEIGHTS:  Wt Readings from Last 3 " Encounters:   06/19/25 62.6 kg (138 lb)   05/13/25 63.8 kg (140 lb 10.5 oz)   03/21/25 62.1 kg (137 lb)       PHYSICAL EXAM: GENERAL: alert oriented x 3 pleasant and cooperative no acute distress  MODIFIED MALLAMPATI SCORE: 3+  NECK EXAM: normal supple no adenopathy    RESULTS/DATA     Iron (ug/dL)   Date Value   05/12/2025 40   12/11/2024 215 (H)   08/07/2024 53     % Saturation (%)   Date Value   05/12/2025 12 (L)   12/11/2024 67 (H)   08/07/2024 13 (L)     TIBC (ug/dL)   Date Value   05/12/2025 337   12/11/2024 320   08/07/2024 393     Ferritin (ng/mL)   Date Value   05/12/2025 35   12/11/2024 32   08/07/2024 49       ASSESSMENT/PLAN     Ms. Betancourt is a 29 y.o. female and was referred to the [facility] Medicine Clinic for the following issues:    OBSTRUCTIVE SLEEP APNEA / SLEEPINESS/ FREQUENT WAKING  -Ordering sleep study to evaluate  -sleepiness likely secondary in part to insufficient sleep duration    Followup 3 weeks after sleep study to review results

## 2025-06-19 ENCOUNTER — OFFICE VISIT (OUTPATIENT)
Dept: SLEEP MEDICINE | Facility: CLINIC | Age: 30
End: 2025-06-19
Payer: MEDICAID

## 2025-06-19 VITALS
BODY MASS INDEX: 26.06 KG/M2 | DIASTOLIC BLOOD PRESSURE: 70 MMHG | HEIGHT: 61 IN | HEART RATE: 61 BPM | SYSTOLIC BLOOD PRESSURE: 109 MMHG | WEIGHT: 138 LBS

## 2025-06-19 DIAGNOSIS — D50.0 IRON DEFICIENCY ANEMIA DUE TO CHRONIC BLOOD LOSS: ICD-10-CM

## 2025-06-19 DIAGNOSIS — D64.9 ANEMIA, UNSPECIFIED TYPE: ICD-10-CM

## 2025-06-19 DIAGNOSIS — G47.30 SLEEP-RELATED BREATHING DISORDER: Primary | ICD-10-CM

## 2025-06-19 DIAGNOSIS — K90.9 IDIOPATHIC STEATORRHEA (HHS-HCC): ICD-10-CM

## 2025-06-19 DIAGNOSIS — D50.8 OTHER IRON DEFICIENCY ANEMIA: ICD-10-CM

## 2025-06-19 PROCEDURE — 99213 OFFICE O/P EST LOW 20 MIN: CPT | Performed by: PHYSICIAN ASSISTANT

## 2025-06-19 PROCEDURE — 3008F BODY MASS INDEX DOCD: CPT | Performed by: PHYSICIAN ASSISTANT

## 2025-06-19 PROCEDURE — 1036F TOBACCO NON-USER: CPT | Performed by: PHYSICIAN ASSISTANT

## 2025-06-19 PROCEDURE — 99243 OFF/OP CNSLTJ NEW/EST LOW 30: CPT | Performed by: PHYSICIAN ASSISTANT

## 2025-06-19 ASSESSMENT — LIFESTYLE VARIABLES: HOW MANY STANDARD DRINKS CONTAINING ALCOHOL DO YOU HAVE ON A TYPICAL DAY: PATIENT DOES NOT DRINK

## 2025-06-19 ASSESSMENT — SLEEP AND FATIGUE QUESTIONNAIRES
ESS-CHAD TOTAL SCORE: 4
SITING INACTIVE IN A PUBLIC PLACE LIKE A CLASS ROOM OR A MOVIE THEATER: WOULD NEVER DOZE
HOW LIKELY ARE YOU TO NOD OFF OR FALL ASLEEP WHILE SITTING AND TALKING TO SOMEONE: WOULD NEVER DOZE
HOW LIKELY ARE YOU TO NOD OFF OR FALL ASLEEP WHILE SITTING QUIETLY AFTER LUNCH WITHOUT ALCOHOL: WOULD NEVER DOZE
HOW LIKELY ARE YOU TO NOD OFF OR FALL ASLEEP IN A CAR, WHILE STOPPED FOR A FEW MINUTES IN TRAFFIC: WOULD NEVER DOZE
HOW LIKELY ARE YOU TO NOD OFF OR FALL ASLEEP WHILE LYING DOWN TO REST IN THE AFTERNOON WHEN CIRCUMSTANCES PERMIT: WOULD NEVER DOZE
HOW LIKELY ARE YOU TO NOD OFF OR FALL ASLEEP WHILE WATCHING TV: HIGH CHANCE OF DOZING
HOW LIKELY ARE YOU TO NOD OFF OR FALL ASLEEP WHILE SITTING AND READING: WOULD NEVER DOZE
HOW LIKELY ARE YOU TO NOD OFF OR FALL ASLEEP WHEN YOU ARE A PASSENGER IN A CAR FOR AN HOUR WITHOUT A BREAK: SLIGHT CHANCE OF DOZING

## 2025-06-19 ASSESSMENT — PATIENT HEALTH QUESTIONNAIRE - PHQ9
1. LITTLE INTEREST OR PLEASURE IN DOING THINGS: NOT AT ALL
2. FEELING DOWN, DEPRESSED OR HOPELESS: NOT AT ALL
SUM OF ALL RESPONSES TO PHQ9 QUESTIONS 1 & 2: 0

## 2025-06-19 ASSESSMENT — PAIN SCALES - GENERAL: PAINLEVEL_OUTOF10: 0-NO PAIN

## 2025-06-19 NOTE — PATIENT INSTRUCTIONS
Thank you for coming to the Sleep Medicine Clinic today! Your sleep medicine provider today was: Chris Stevenson PA-C Below is a summary of your treatment plan, other important information, and our contact numbers:      TREATMENT PLAN     Call [phone]), option 3 to schedule your sleep study. When you have an appointment please call us back at [phone] to schedule a followup appointment 3-4 weeks after to review results.    Obstructive Sleep Apnea (TIERNEY) is a sleep disorder where your upper airway muscles relax during sleep and the airway intermittently and repetitively narrows and collapses leading to partially blocked airway (hypopnea) or completely blocked airway (apnea) which, in turn, can disrupt breathing in sleep, lower oxygen levels while you sleep and cause night time wakings. Because both apnea and hypopnea may cause higher carbon dioxide or low oxygen levels, untreated TIERNEY can lead to heart arrhythmia, elevation of blood pressure, and make it harder for the body to consolidate memory and facilitate metabolism (leading to higher blood sugars at night). Frequent partial arousals occur during sleep resulting in sleep deprivation and daytime sleepiness. TIERNEY is associated with an increased risk of cardiovascular disease, stroke, hypertension, and insulin resistance. Moreover, untreated TIERNEY with excessive daytime sleepiness can increase the risk of motor vehicular accidents.    Some conservative strategies for TIERNEY regardless of TIERNEY severity are:   Positional therapy - Avoid sleeping on your back.   Healthy diet and regular exercise to optimize weight is highly encouraged.   Avoid alcohol late in the evening and sedative-hypnotics as these substances can make sleep apnea worse.   Improve breathing through the nose with intranasal steroid spray, saline rinse, or antihistamines    Safety: Avoid driving vehicle and operating heavy equipment while sleepy. Drowsy driving may lead to life-threatening  motor vehicle accidents. A person driving while sleepy is 5 times more likely to have an accident. If you feel sleepy, pull over and take a short power nap (sleep for less than 30 minutes). Otherwise, ask somebody to drive you.    Treatment options for sleep apnea include weight management, positional therapy, Positive Airway Therapy (PAP) therapy, oral appliance therapy, hypoglossal nerve stimulator (Inspire) and select airway surgeries.      OUR SLEEP TESTING LOCATIONS     Our team will contact you to schedule your sleep study, however, you can contact us as follow:  Main Phone Line (scheduling only): [phone]), option 3  Adult and Pediatric Locations  [facility] (6 years and older): [facility] - 4th floor ([address]) After hours line: [phone]  [facility] (Main campus: All ages): [facility], 6th floor. After hours line: [phone]   [place] (18 years and older): [address] [facility], 2nd floor   [place] (18 years and older): [address] [facility]; 4th floor  After hours line: [phone]  [facility] (18 years and older) at [place]: [address] [facility] [facility]  After hours line: [phone]    [place] (5 years and older; younger considered on case-by-case basis): [address] [facility]; [address], [address]. Scheduling  After hours line: [phone]   [place] (6 years and older): [address]; [address]   [place] (6 years and older): [address] [facility], [address]  After hours line: [phone]   [place] (13 years and older) in [place]: [address], 2nd floor  After hours line: [phone]   [place] (13 year and older): [address]  After hours line: [phone]      IMPORTANT PHONE NUMBERS     Sleep Medicine Clinic Fax: [phone]  Appointments (for Adult Sleep Clinic): [phone] - option 2  Appointments (For Sleep Studies):  "[phone] - option 3  Behavioral Sleep Medicine: [phone]    [employer] ([facility]): [phone]  For clinical questions and refilling prescriptions: [phone]  Yudith Allison (For Isacc/Elva): P: [phone]  F: [phone]       CONTACTING YOUR SLEEP MEDICINE PROVIDER     Send a message directly to your provider through \"My Chart\", which is the email service through your  Records Account: [email or web address]   Call [phone] and leave a message. One of the administrative assistants will forward the message to your sleep medicine provider through \"My Chart\" and/or email.     Your sleep medicine provider for this visit was: Chris Stevenson PA-C  "

## 2025-06-26 ENCOUNTER — CLINICAL SUPPORT (OUTPATIENT)
Dept: NUTRITION | Facility: HOSPITAL | Age: 30
End: 2025-06-26
Payer: MEDICAID

## 2025-06-26 DIAGNOSIS — Z59.41 FOOD INSECURITY: ICD-10-CM

## 2025-06-26 SDOH — ECONOMIC STABILITY - FOOD INSECURITY: FOOD INSECURITY: Z59.41

## 2025-06-26 NOTE — PROGRESS NOTES
[employer]  Diet Recommendation 1: Healthy Eating  Diet Recommendation 2: MyPlate  Other Diet Recommendations: Trying new foods- increase fruit and veggie intake in her diet. Frozen and canned veggies usual consumption, encouraged fresh today!  Food Intolerance Avoidance: NKFA  Nutrition Goals Stated: Pt lives with her sister but prepares food on her own. Her cooking skills is limited, per patient and uses the microwave for most of her meals. She relies on easy foods that she can microwave. Encouraged pt to try new fruits and veggies  Household Size: 1 Family Member  Interventions: Referral Number: 1st 6 Mo Referral 6 Mos  Interventions: Visit Number: 1 of 6 Visits - Max 6 Visits/Referral Each 6 Mo Period  Follow Up Notes for Future Visits: Took 2 potatoes to try in the microwave! Ask how they turned out;  Recipes Today: None taken today.  Grains: Whole - 100%  Fruit: % Fresh  Vegetables: Fresh - 100%  Proteins: 1-2 Plant-based Items  Originating Site of Referral Order: Cesia Barrios  Initials of RD Assisting Today: KAITLYNN

## 2025-06-27 ENCOUNTER — APPOINTMENT (OUTPATIENT)
Dept: NEUROLOGY | Facility: CLINIC | Age: 30
End: 2025-06-27
Payer: MEDICAID

## 2025-07-22 ENCOUNTER — CLINICAL SUPPORT (OUTPATIENT)
Dept: SLEEP MEDICINE | Facility: CLINIC | Age: 30
End: 2025-07-22
Payer: MEDICAID

## 2025-07-22 VITALS
DIASTOLIC BLOOD PRESSURE: 70 MMHG | SYSTOLIC BLOOD PRESSURE: 109 MMHG | WEIGHT: 138.01 LBS | BODY MASS INDEX: 26.06 KG/M2 | HEIGHT: 61 IN

## 2025-07-22 DIAGNOSIS — G47.30 SLEEP-RELATED BREATHING DISORDER: ICD-10-CM

## 2025-07-22 ASSESSMENT — SLEEP AND FATIGUE QUESTIONNAIRES
HOW LIKELY ARE YOU TO NOD OFF OR FALL ASLEEP WHILE SITTING AND READING: HIGH CHANCE OF DOZING
HOW LIKELY ARE YOU TO NOD OFF OR FALL ASLEEP WHILE WATCHING TV: HIGH CHANCE OF DOZING
HOW LIKELY ARE YOU TO NOD OFF OR FALL ASLEEP WHEN YOU ARE A PASSENGER IN A CAR FOR AN HOUR WITHOUT A BREAK: HIGH CHANCE OF DOZING
HOW LIKELY ARE YOU TO NOD OFF OR FALL ASLEEP WHILE LYING DOWN TO REST IN THE AFTERNOON WHEN CIRCUMSTANCES PERMIT: WOULD NEVER DOZE
SITING INACTIVE IN A PUBLIC PLACE LIKE A CLASS ROOM OR A MOVIE THEATER: HIGH CHANCE OF DOZING
HOW LIKELY ARE YOU TO NOD OFF OR FALL ASLEEP WHILE SITTING QUIETLY AFTER LUNCH WITHOUT ALCOHOL: HIGH CHANCE OF DOZING
ESS-CHAD TOTAL SCORE: 18
HOW LIKELY ARE YOU TO NOD OFF OR FALL ASLEEP WHILE SITTING AND TALKING TO SOMEONE: WOULD NEVER DOZE
HOW LIKELY ARE YOU TO NOD OFF OR FALL ASLEEP IN A CAR, WHILE STOPPED FOR A FEW MINUTES IN TRAFFIC: HIGH CHANCE OF DOZING

## 2025-07-23 NOTE — PROGRESS NOTES
[facility] TECH NOTE:     Patient: Belinda Betancourt   MRN//AGE: [id number]  1995  29 y.o.   Technologist: ASHU Orlando   Room: 3   Service Date: 2025        [facility]    TECHNOLOGIST SLEEP STUDY PROCEDURE NOTE:   This sleep study is being conducted according to the policies and procedures outlined by the AAS accreditation standards.  The sleep study procedure  involved during this appointment was explained to the patient, her questions were answered and she verbalized understanding.      The patient is a 29 y.o. year old female scheduled for aDiagnostic PSG Split night with montage of: standard. she arrived for her appointment.      The study that was ultimately completed was aDiagnostic PSG Split night with montage of: standard.    The full study Was completed.  Patient questionnaires completed?: yes     Consents signed? yes    Initial Fall Risk Screening:     Belinda has not fallen in the last 6 months. her did not result in injury. Belinda does not have a fear of falling. He does not need assistance with sitting, standing, or walking. she does not need assistance walking in her home. she does not need assistance in an unfamiliar setting. The patient is notusing an assistive device.     Brief Study observations: A diagnostic PSG was performed.  The patient fell asleep while waiting for the tech to begin setting her up and again prior to performing biocals at the beginning of the study.    Deviation to order/protocol and reason: The  AHI per hour was < 15, so CPAP was not introduced.       If PAP, which was preferred mask/pressure/mode: NA      Other:None    After the procedure, the patient was informed to ensure followup with ordering clinician for testing results.      Technologist: ASHU Orlando

## 2025-08-01 ENCOUNTER — CLINICAL SUPPORT (OUTPATIENT)
Dept: NUTRITION | Facility: HOSPITAL | Age: 30
End: 2025-08-01
Payer: MEDICAID

## 2025-08-01 NOTE — PROGRESS NOTES
[employer]  Diet Recommendation 1: Healthy Eating  Diet Recommendation 2: MyPlate  Food Intolerance Avoidance: NKFA. Food options are very limited because she states she does not know how to use the stove and cannot use a can opener because she is lefthanded. Mainly eats frozen dinners, microwavable food.  Household Size: 1 Family Member  Interventions: Referral Number: 1st 6 Mo Referral 6 Mos  Interventions: Visit Number: 3 of 6 Visits - Max 6 Visits/Referral Each 6 Mo Period  Education Today: MyPlate Meals  Follow Up Notes for Future Visits: Has yet to use a lot of the food she got from [facility] last time but did try the shelf stable milk and ate the apples.  Grains: 50-75% Whole  Fruit: 25-50% Fresh  Vegetables: 0-25% Fresh  Proteins: 1-2 Plant-based Items  Dairy: Lowfat - 100%  Originating Site of Referral Order: Cesia Barrios  Initials of RD Assisting Today: [facility]

## 2025-10-02 ENCOUNTER — APPOINTMENT (OUTPATIENT)
Dept: PRIMARY CARE | Facility: CLINIC | Age: 30
End: 2025-10-02
Payer: MEDICAID